# Patient Record
Sex: MALE | Race: WHITE | NOT HISPANIC OR LATINO
[De-identification: names, ages, dates, MRNs, and addresses within clinical notes are randomized per-mention and may not be internally consistent; named-entity substitution may affect disease eponyms.]

---

## 2017-01-26 ENCOUNTER — APPOINTMENT (OUTPATIENT)
Dept: CARDIOLOGY | Facility: CLINIC | Age: 57
End: 2017-01-26

## 2017-01-26 ENCOUNTER — NON-APPOINTMENT (OUTPATIENT)
Age: 57
End: 2017-01-26

## 2017-01-26 VITALS
HEIGHT: 73 IN | DIASTOLIC BLOOD PRESSURE: 90 MMHG | SYSTOLIC BLOOD PRESSURE: 179 MMHG | TEMPERATURE: 98.2 F | WEIGHT: 315 LBS | BODY MASS INDEX: 41.75 KG/M2 | HEART RATE: 80 BPM | OXYGEN SATURATION: 97 % | RESPIRATION RATE: 14 BRPM

## 2017-02-23 ENCOUNTER — NON-APPOINTMENT (OUTPATIENT)
Age: 57
End: 2017-02-23

## 2017-02-23 ENCOUNTER — APPOINTMENT (OUTPATIENT)
Dept: CARDIOLOGY | Facility: CLINIC | Age: 57
End: 2017-02-23

## 2017-02-23 VITALS
RESPIRATION RATE: 14 BRPM | OXYGEN SATURATION: 95 % | HEIGHT: 73 IN | WEIGHT: 315 LBS | HEART RATE: 86 BPM | BODY MASS INDEX: 41.75 KG/M2 | SYSTOLIC BLOOD PRESSURE: 164 MMHG | DIASTOLIC BLOOD PRESSURE: 93 MMHG

## 2017-03-16 ENCOUNTER — MEDICATION RENEWAL (OUTPATIENT)
Age: 57
End: 2017-03-16

## 2017-03-17 ENCOUNTER — APPOINTMENT (OUTPATIENT)
Dept: SURGERY | Facility: CLINIC | Age: 57
End: 2017-03-17

## 2017-03-17 VITALS
RESPIRATION RATE: 15 BRPM | WEIGHT: 315 LBS | HEIGHT: 72 IN | BODY MASS INDEX: 42.66 KG/M2 | SYSTOLIC BLOOD PRESSURE: 162 MMHG | OXYGEN SATURATION: 95 % | TEMPERATURE: 98.6 F | HEART RATE: 63 BPM | DIASTOLIC BLOOD PRESSURE: 85 MMHG

## 2017-03-23 ENCOUNTER — NON-APPOINTMENT (OUTPATIENT)
Age: 57
End: 2017-03-23

## 2017-03-23 ENCOUNTER — APPOINTMENT (OUTPATIENT)
Dept: CARDIOLOGY | Facility: CLINIC | Age: 57
End: 2017-03-23

## 2017-03-23 VITALS
WEIGHT: 315 LBS | HEIGHT: 72 IN | DIASTOLIC BLOOD PRESSURE: 90 MMHG | BODY MASS INDEX: 42.66 KG/M2 | HEART RATE: 60 BPM | SYSTOLIC BLOOD PRESSURE: 164 MMHG | RESPIRATION RATE: 16 BRPM

## 2017-04-06 ENCOUNTER — APPOINTMENT (OUTPATIENT)
Dept: VASCULAR SURGERY | Facility: CLINIC | Age: 57
End: 2017-04-06

## 2017-04-06 VITALS
BODY MASS INDEX: 42.66 KG/M2 | HEART RATE: 70 BPM | DIASTOLIC BLOOD PRESSURE: 90 MMHG | WEIGHT: 315 LBS | HEIGHT: 72 IN | TEMPERATURE: 98 F | SYSTOLIC BLOOD PRESSURE: 132 MMHG

## 2017-05-02 ENCOUNTER — TRANSCRIPTION ENCOUNTER (OUTPATIENT)
Age: 57
End: 2017-05-02

## 2017-05-12 ENCOUNTER — NON-APPOINTMENT (OUTPATIENT)
Age: 57
End: 2017-05-12

## 2017-05-12 ENCOUNTER — APPOINTMENT (OUTPATIENT)
Dept: CARDIOLOGY | Facility: CLINIC | Age: 57
End: 2017-05-12

## 2017-05-12 VITALS
RESPIRATION RATE: 14 BRPM | HEIGHT: 72 IN | DIASTOLIC BLOOD PRESSURE: 80 MMHG | BODY MASS INDEX: 42.66 KG/M2 | SYSTOLIC BLOOD PRESSURE: 152 MMHG | WEIGHT: 315 LBS | HEART RATE: 82 BPM

## 2017-05-19 ENCOUNTER — APPOINTMENT (OUTPATIENT)
Dept: SURGERY | Facility: CLINIC | Age: 57
End: 2017-05-19

## 2017-08-18 ENCOUNTER — APPOINTMENT (OUTPATIENT)
Dept: SURGERY | Facility: CLINIC | Age: 57
End: 2017-08-18
Payer: COMMERCIAL

## 2017-08-18 DIAGNOSIS — K22.11 ULCER OF ESOPHAGUS WITH BLEEDING: ICD-10-CM

## 2017-08-18 DIAGNOSIS — E55.9 VITAMIN D DEFICIENCY, UNSPECIFIED: ICD-10-CM

## 2017-08-18 PROCEDURE — 99213 OFFICE O/P EST LOW 20 MIN: CPT

## 2017-08-18 RX ORDER — SODIUM SULFATE, POTASSIUM SULFATE, MAGNESIUM SULFATE 17.5; 3.13; 1.6 G/ML; G/ML; G/ML
17.5-3.13-1.6 SOLUTION, CONCENTRATE ORAL
Qty: 354 | Refills: 0 | Status: DISCONTINUED | COMMUNITY
Start: 2017-06-10

## 2017-08-18 RX ORDER — TAMSULOSIN HYDROCHLORIDE 0.4 MG/1
0.4 CAPSULE ORAL
Qty: 30 | Refills: 0 | Status: DISCONTINUED | COMMUNITY
Start: 2017-04-26

## 2017-08-18 RX ORDER — ALBUTEROL SULFATE 90 UG/1
108 (90 BASE) AEROSOL, METERED RESPIRATORY (INHALATION)
Qty: 8 | Refills: 0 | Status: DISCONTINUED | COMMUNITY
Start: 2017-05-02

## 2017-08-18 RX ORDER — BENZONATATE 200 MG/1
200 CAPSULE ORAL
Qty: 20 | Refills: 0 | Status: DISCONTINUED | COMMUNITY
Start: 2017-04-29

## 2017-08-22 PROBLEM — E55.9 LOW VITAMIN D LEVEL: Status: ACTIVE | Noted: 2017-08-22

## 2017-08-29 ENCOUNTER — NON-APPOINTMENT (OUTPATIENT)
Age: 57
End: 2017-08-29

## 2017-08-29 ENCOUNTER — APPOINTMENT (OUTPATIENT)
Dept: CARDIOLOGY | Facility: CLINIC | Age: 57
End: 2017-08-29
Payer: COMMERCIAL

## 2017-08-29 VITALS
DIASTOLIC BLOOD PRESSURE: 84 MMHG | WEIGHT: 315 LBS | BODY MASS INDEX: 42.66 KG/M2 | RESPIRATION RATE: 14 BRPM | OXYGEN SATURATION: 96 % | HEART RATE: 66 BPM | HEIGHT: 72 IN | SYSTOLIC BLOOD PRESSURE: 165 MMHG

## 2017-08-29 PROCEDURE — 93000 ELECTROCARDIOGRAM COMPLETE: CPT

## 2017-08-29 PROCEDURE — 99214 OFFICE O/P EST MOD 30 MIN: CPT

## 2017-10-03 ENCOUNTER — APPOINTMENT (OUTPATIENT)
Dept: CARDIOLOGY | Facility: CLINIC | Age: 57
End: 2017-10-03
Payer: COMMERCIAL

## 2017-10-03 ENCOUNTER — NON-APPOINTMENT (OUTPATIENT)
Age: 57
End: 2017-10-03

## 2017-10-03 VITALS
BODY MASS INDEX: 42.66 KG/M2 | OXYGEN SATURATION: 96 % | RESPIRATION RATE: 14 BRPM | WEIGHT: 315 LBS | DIASTOLIC BLOOD PRESSURE: 80 MMHG | SYSTOLIC BLOOD PRESSURE: 181 MMHG | HEART RATE: 68 BPM | HEIGHT: 72 IN

## 2017-10-03 PROCEDURE — 93000 ELECTROCARDIOGRAM COMPLETE: CPT

## 2017-10-03 PROCEDURE — 99214 OFFICE O/P EST MOD 30 MIN: CPT

## 2017-10-13 ENCOUNTER — TRANSCRIPTION ENCOUNTER (OUTPATIENT)
Age: 57
End: 2017-10-13

## 2017-10-13 ENCOUNTER — OUTPATIENT (OUTPATIENT)
Dept: OUTPATIENT SERVICES | Facility: HOSPITAL | Age: 57
LOS: 1 days | End: 2017-10-13
Payer: COMMERCIAL

## 2017-10-13 VITALS
TEMPERATURE: 98 F | OXYGEN SATURATION: 98 % | HEIGHT: 73 IN | DIASTOLIC BLOOD PRESSURE: 78 MMHG | WEIGHT: 315 LBS | SYSTOLIC BLOOD PRESSURE: 135 MMHG | HEART RATE: 62 BPM | RESPIRATION RATE: 18 BRPM

## 2017-10-13 DIAGNOSIS — Z01.818 ENCOUNTER FOR OTHER PREPROCEDURAL EXAMINATION: ICD-10-CM

## 2017-10-13 DIAGNOSIS — Z98.890 OTHER SPECIFIED POSTPROCEDURAL STATES: Chronic | ICD-10-CM

## 2017-10-13 DIAGNOSIS — K21.9 GASTRO-ESOPHAGEAL REFLUX DISEASE WITHOUT ESOPHAGITIS: ICD-10-CM

## 2017-10-13 DIAGNOSIS — G47.33 OBSTRUCTIVE SLEEP APNEA (ADULT) (PEDIATRIC): ICD-10-CM

## 2017-10-13 DIAGNOSIS — I10 ESSENTIAL (PRIMARY) HYPERTENSION: ICD-10-CM

## 2017-10-13 DIAGNOSIS — E66.01 MORBID (SEVERE) OBESITY DUE TO EXCESS CALORIES: ICD-10-CM

## 2017-10-13 DIAGNOSIS — I48.0 PAROXYSMAL ATRIAL FIBRILLATION: ICD-10-CM

## 2017-10-13 LAB
ALBUMIN SERPL ELPH-MCNC: 4.3 G/DL — SIGNIFICANT CHANGE UP (ref 3.3–5)
ALP SERPL-CCNC: 65 U/L — SIGNIFICANT CHANGE UP (ref 40–120)
ALT FLD-CCNC: 24 U/L — SIGNIFICANT CHANGE UP (ref 10–45)
ANION GAP SERPL CALC-SCNC: 18 MMOL/L — HIGH (ref 5–17)
AST SERPL-CCNC: 26 U/L — SIGNIFICANT CHANGE UP (ref 10–40)
BILIRUB SERPL-MCNC: 0.6 MG/DL — SIGNIFICANT CHANGE UP (ref 0.2–1.2)
BLD GP AB SCN SERPL QL: NEGATIVE — SIGNIFICANT CHANGE UP
BUN SERPL-MCNC: 19 MG/DL — SIGNIFICANT CHANGE UP (ref 7–23)
CALCIUM SERPL-MCNC: 9.8 MG/DL — SIGNIFICANT CHANGE UP (ref 8.4–10.5)
CHLORIDE SERPL-SCNC: 102 MMOL/L — SIGNIFICANT CHANGE UP (ref 96–108)
CO2 SERPL-SCNC: 20 MMOL/L — LOW (ref 22–31)
CREAT SERPL-MCNC: 0.93 MG/DL — SIGNIFICANT CHANGE UP (ref 0.5–1.3)
GLUCOSE SERPL-MCNC: 85 MG/DL — SIGNIFICANT CHANGE UP (ref 70–99)
HBA1C BLD-MCNC: 5.8 % — HIGH (ref 4–5.6)
HCT VFR BLD CALC: 46.6 % — SIGNIFICANT CHANGE UP (ref 39–50)
HGB BLD-MCNC: 15.4 G/DL — SIGNIFICANT CHANGE UP (ref 13–17)
MCHC RBC-ENTMCNC: 29.6 PG — SIGNIFICANT CHANGE UP (ref 27–34)
MCHC RBC-ENTMCNC: 33 GM/DL — SIGNIFICANT CHANGE UP (ref 32–36)
MCV RBC AUTO: 89.4 FL — SIGNIFICANT CHANGE UP (ref 80–100)
PLATELET # BLD AUTO: 205 K/UL — SIGNIFICANT CHANGE UP (ref 150–400)
POTASSIUM SERPL-MCNC: 4.4 MMOL/L — SIGNIFICANT CHANGE UP (ref 3.5–5.3)
POTASSIUM SERPL-SCNC: 4.4 MMOL/L — SIGNIFICANT CHANGE UP (ref 3.5–5.3)
PROT SERPL-MCNC: 7.8 G/DL — SIGNIFICANT CHANGE UP (ref 6–8.3)
RBC # BLD: 5.21 M/UL — SIGNIFICANT CHANGE UP (ref 4.2–5.8)
RBC # FLD: 13.9 % — SIGNIFICANT CHANGE UP (ref 10.3–14.5)
RH IG SCN BLD-IMP: POSITIVE — SIGNIFICANT CHANGE UP
SODIUM SERPL-SCNC: 140 MMOL/L — SIGNIFICANT CHANGE UP (ref 135–145)
WBC # BLD: 7.47 K/UL — SIGNIFICANT CHANGE UP (ref 3.8–10.5)
WBC # FLD AUTO: 7.47 K/UL — SIGNIFICANT CHANGE UP (ref 3.8–10.5)

## 2017-10-13 PROCEDURE — 85027 COMPLETE CBC AUTOMATED: CPT

## 2017-10-13 PROCEDURE — 86850 RBC ANTIBODY SCREEN: CPT

## 2017-10-13 PROCEDURE — 80053 COMPREHEN METABOLIC PANEL: CPT

## 2017-10-13 PROCEDURE — 83036 HEMOGLOBIN GLYCOSYLATED A1C: CPT

## 2017-10-13 PROCEDURE — 86900 BLOOD TYPING SEROLOGIC ABO: CPT

## 2017-10-13 PROCEDURE — 86901 BLOOD TYPING SEROLOGIC RH(D): CPT

## 2017-10-13 PROCEDURE — G0463: CPT

## 2017-10-13 RX ORDER — LIDOCAINE HCL 20 MG/ML
0.2 VIAL (ML) INJECTION ONCE
Qty: 0 | Refills: 0 | Status: DISCONTINUED | OUTPATIENT
Start: 2017-10-24 | End: 2017-10-25

## 2017-10-13 RX ORDER — CEFAZOLIN SODIUM 1 G
3000 VIAL (EA) INJECTION ONCE
Qty: 0 | Refills: 0 | Status: DISCONTINUED | OUTPATIENT
Start: 2017-10-24 | End: 2017-10-25

## 2017-10-13 RX ORDER — SODIUM CHLORIDE 9 MG/ML
3 INJECTION INTRAMUSCULAR; INTRAVENOUS; SUBCUTANEOUS EVERY 8 HOURS
Qty: 0 | Refills: 0 | Status: DISCONTINUED | OUTPATIENT
Start: 2017-10-24 | End: 2017-10-25

## 2017-10-13 NOTE — H&P PST ADULT - PROBLEM SELECTOR PLAN 1
Laparoscopic Vertical sleeve Gastrectomy  Pre- Op instructions discussed    CBC,CMP,HgA1c,Type and Screen sent   heparin SQ ordered   incentive spirometer instructions discussed  Pt was consulted by pharmacist for medication regimen after surgery

## 2017-10-13 NOTE — H&P PST ADULT - HISTORY OF PRESENT ILLNESS
56 y/o male with PMH of Anxiety , HTN ,Palpitations , h/o Paroxysmal Afib, ROCIO- CPAP ,Morbid Obesity. Presents to PST for scheduled  Laparoscopic Vertical Sleeve Gastrectomy on 10/24/2017.

## 2017-10-13 NOTE — H&P PST ADULT - PSH
S/P colonoscopy    S/P inguinal hernia repair  7-8 years ago  S/P right knee arthroscopy  long time ago

## 2017-10-13 NOTE — H&P PST ADULT - PMH
Anxiety    GERD (gastroesophageal reflux disease)    HTN (hypertension)    Low vitamin D level    Morbid (severe) obesity due to excess calories    ROCIO on CPAP    Paroxysmal atrial fibrillation  on metoprolol -f/u with cardiology

## 2017-10-16 ENCOUNTER — RX RENEWAL (OUTPATIENT)
Age: 57
End: 2017-10-16

## 2017-10-16 ENCOUNTER — APPOINTMENT (OUTPATIENT)
Dept: SURGERY | Facility: CLINIC | Age: 57
End: 2017-10-16
Payer: COMMERCIAL

## 2017-10-16 VITALS
OXYGEN SATURATION: 99 % | HEIGHT: 72 IN | SYSTOLIC BLOOD PRESSURE: 135 MMHG | HEART RATE: 80 BPM | DIASTOLIC BLOOD PRESSURE: 85 MMHG | WEIGHT: 315 LBS | BODY MASS INDEX: 42.66 KG/M2 | RESPIRATION RATE: 18 BRPM | TEMPERATURE: 98.6 F

## 2017-10-16 PROCEDURE — 99215 OFFICE O/P EST HI 40 MIN: CPT

## 2017-10-24 ENCOUNTER — RESULT REVIEW (OUTPATIENT)
Age: 57
End: 2017-10-24

## 2017-10-24 ENCOUNTER — TRANSCRIPTION ENCOUNTER (OUTPATIENT)
Age: 57
End: 2017-10-24

## 2017-10-24 ENCOUNTER — INPATIENT (INPATIENT)
Facility: HOSPITAL | Age: 57
LOS: 0 days | Discharge: HOME | DRG: 621 | End: 2017-10-25
Attending: SURGERY | Admitting: SURGERY
Payer: COMMERCIAL

## 2017-10-24 ENCOUNTER — APPOINTMENT (OUTPATIENT)
Dept: SURGERY | Facility: HOSPITAL | Age: 57
End: 2017-10-24
Payer: COMMERCIAL

## 2017-10-24 VITALS
RESPIRATION RATE: 20 BRPM | HEART RATE: 70 BPM | TEMPERATURE: 98 F | DIASTOLIC BLOOD PRESSURE: 74 MMHG | HEIGHT: 72 IN | OXYGEN SATURATION: 95 % | WEIGHT: 315 LBS | SYSTOLIC BLOOD PRESSURE: 153 MMHG

## 2017-10-24 DIAGNOSIS — Z98.890 OTHER SPECIFIED POSTPROCEDURAL STATES: Chronic | ICD-10-CM

## 2017-10-24 DIAGNOSIS — E66.01 MORBID (SEVERE) OBESITY DUE TO EXCESS CALORIES: ICD-10-CM

## 2017-10-24 LAB
ANION GAP SERPL CALC-SCNC: 14 MMOL/L — SIGNIFICANT CHANGE UP (ref 5–17)
BASOPHILS # BLD AUTO: 0.1 K/UL — SIGNIFICANT CHANGE UP (ref 0–0.2)
BASOPHILS NFR BLD AUTO: 0.7 % — SIGNIFICANT CHANGE UP (ref 0–2)
BUN SERPL-MCNC: 15 MG/DL — SIGNIFICANT CHANGE UP (ref 7–23)
CALCIUM SERPL-MCNC: 9.1 MG/DL — SIGNIFICANT CHANGE UP (ref 8.4–10.5)
CHLORIDE SERPL-SCNC: 101 MMOL/L — SIGNIFICANT CHANGE UP (ref 96–108)
CO2 SERPL-SCNC: 24 MMOL/L — SIGNIFICANT CHANGE UP (ref 22–31)
CREAT SERPL-MCNC: 0.93 MG/DL — SIGNIFICANT CHANGE UP (ref 0.5–1.3)
EOSINOPHIL # BLD AUTO: 0 K/UL — SIGNIFICANT CHANGE UP (ref 0–0.5)
EOSINOPHIL NFR BLD AUTO: 0.2 % — SIGNIFICANT CHANGE UP (ref 0–6)
GLUCOSE BLDC GLUCOMTR-MCNC: 83 MG/DL — SIGNIFICANT CHANGE UP (ref 70–99)
GLUCOSE SERPL-MCNC: 110 MG/DL — HIGH (ref 70–99)
HCT VFR BLD CALC: 45.3 % — SIGNIFICANT CHANGE UP (ref 39–50)
HGB BLD-MCNC: 15.1 G/DL — SIGNIFICANT CHANGE UP (ref 13–17)
LYMPHOCYTES # BLD AUTO: 0.8 K/UL — LOW (ref 1–3.3)
LYMPHOCYTES # BLD AUTO: 6.6 % — LOW (ref 13–44)
MAGNESIUM SERPL-MCNC: 1.8 MG/DL — SIGNIFICANT CHANGE UP (ref 1.6–2.6)
MCHC RBC-ENTMCNC: 30.7 PG — SIGNIFICANT CHANGE UP (ref 27–34)
MCHC RBC-ENTMCNC: 33.4 GM/DL — SIGNIFICANT CHANGE UP (ref 32–36)
MCV RBC AUTO: 92.1 FL — SIGNIFICANT CHANGE UP (ref 80–100)
MONOCYTES # BLD AUTO: 0.3 K/UL — SIGNIFICANT CHANGE UP (ref 0–0.9)
MONOCYTES NFR BLD AUTO: 2.5 % — SIGNIFICANT CHANGE UP (ref 2–14)
NEUTROPHILS # BLD AUTO: 10.5 K/UL — HIGH (ref 1.8–7.4)
NEUTROPHILS NFR BLD AUTO: 90 % — HIGH (ref 43–77)
PHOSPHATE SERPL-MCNC: 2.8 MG/DL — SIGNIFICANT CHANGE UP (ref 2.5–4.5)
PLATELET # BLD AUTO: 184 K/UL — SIGNIFICANT CHANGE UP (ref 150–400)
POTASSIUM SERPL-MCNC: 4.3 MMOL/L — SIGNIFICANT CHANGE UP (ref 3.5–5.3)
POTASSIUM SERPL-SCNC: 4.3 MMOL/L — SIGNIFICANT CHANGE UP (ref 3.5–5.3)
RBC # BLD: 4.92 M/UL — SIGNIFICANT CHANGE UP (ref 4.2–5.8)
RBC # FLD: 13 % — SIGNIFICANT CHANGE UP (ref 10.3–14.5)
RH IG SCN BLD-IMP: POSITIVE — SIGNIFICANT CHANGE UP
SODIUM SERPL-SCNC: 139 MMOL/L — SIGNIFICANT CHANGE UP (ref 135–145)
TROPONIN T SERPL-MCNC: <0.01 NG/ML — SIGNIFICANT CHANGE UP (ref 0–0.06)
WBC # BLD: 11.7 K/UL — HIGH (ref 3.8–10.5)
WBC # FLD AUTO: 11.7 K/UL — HIGH (ref 3.8–10.5)

## 2017-10-24 PROCEDURE — 88305 TISSUE EXAM BY PATHOLOGIST: CPT | Mod: 26

## 2017-10-24 PROCEDURE — 93010 ELECTROCARDIOGRAM REPORT: CPT

## 2017-10-24 PROCEDURE — 43775 LAP SLEEVE GASTRECTOMY: CPT

## 2017-10-24 PROCEDURE — 88304 TISSUE EXAM BY PATHOLOGIST: CPT | Mod: 26

## 2017-10-24 PROCEDURE — 43281 LAP PARAESOPHAG HERN REPAIR: CPT | Mod: 59

## 2017-10-24 RX ORDER — ACETAMINOPHEN 500 MG
1000 TABLET ORAL ONCE
Qty: 0 | Refills: 0 | Status: COMPLETED | OUTPATIENT
Start: 2017-10-25 | End: 2017-10-25

## 2017-10-24 RX ORDER — MAGNESIUM SULFATE 500 MG/ML
2 VIAL (ML) INJECTION ONCE
Qty: 0 | Refills: 0 | Status: COMPLETED | OUTPATIENT
Start: 2017-10-24 | End: 2017-10-25

## 2017-10-24 RX ORDER — HEPARIN SODIUM 5000 [USP'U]/ML
5000 INJECTION INTRAVENOUS; SUBCUTANEOUS ONCE
Qty: 0 | Refills: 0 | Status: COMPLETED | OUTPATIENT
Start: 2017-10-24 | End: 2017-10-24

## 2017-10-24 RX ORDER — ACETAMINOPHEN 500 MG
1000 TABLET ORAL ONCE
Qty: 0 | Refills: 0 | Status: DISCONTINUED | OUTPATIENT
Start: 2017-10-25 | End: 2017-10-25

## 2017-10-24 RX ORDER — POTASSIUM CHLORIDE 20 MEQ
10 PACKET (EA) ORAL
Qty: 0 | Refills: 0 | Status: COMPLETED | OUTPATIENT
Start: 2017-10-24 | End: 2017-10-24

## 2017-10-24 RX ORDER — METOPROLOL TARTRATE 50 MG
5 TABLET ORAL EVERY 6 HOURS
Qty: 0 | Refills: 0 | Status: DISCONTINUED | OUTPATIENT
Start: 2017-10-24 | End: 2017-10-25

## 2017-10-24 RX ORDER — HYOSCYAMINE SULFATE 0.13 MG
1 TABLET ORAL
Qty: 28 | Refills: 0 | OUTPATIENT
Start: 2017-10-24 | End: 2017-10-31

## 2017-10-24 RX ORDER — OXYCODONE HYDROCHLORIDE 5 MG/1
5 TABLET ORAL
Qty: 120 | Refills: 0
Start: 2017-10-24 | End: 2017-10-28

## 2017-10-24 RX ORDER — ONDANSETRON 8 MG/1
4 TABLET, FILM COATED ORAL ONCE
Qty: 0 | Refills: 0 | Status: DISCONTINUED | OUTPATIENT
Start: 2017-10-24 | End: 2017-10-24

## 2017-10-24 RX ORDER — ACETAMINOPHEN 500 MG
1000 TABLET ORAL ONCE
Qty: 0 | Refills: 0 | Status: COMPLETED | OUTPATIENT
Start: 2017-10-24 | End: 2017-10-24

## 2017-10-24 RX ORDER — ONDANSETRON 8 MG/1
4 TABLET, FILM COATED ORAL EVERY 6 HOURS
Qty: 0 | Refills: 0 | Status: DISCONTINUED | OUTPATIENT
Start: 2017-10-24 | End: 2017-10-25

## 2017-10-24 RX ORDER — KETOROLAC TROMETHAMINE 30 MG/ML
30 SYRINGE (ML) INJECTION EVERY 6 HOURS
Qty: 0 | Refills: 0 | Status: DISCONTINUED | OUTPATIENT
Start: 2017-10-24 | End: 2017-10-25

## 2017-10-24 RX ORDER — HYDROMORPHONE HYDROCHLORIDE 2 MG/ML
0.5 INJECTION INTRAMUSCULAR; INTRAVENOUS; SUBCUTANEOUS
Qty: 0 | Refills: 0 | Status: DISCONTINUED | OUTPATIENT
Start: 2017-10-24 | End: 2017-10-24

## 2017-10-24 RX ORDER — PANTOPRAZOLE SODIUM 20 MG/1
40 TABLET, DELAYED RELEASE ORAL DAILY
Qty: 0 | Refills: 0 | Status: DISCONTINUED | OUTPATIENT
Start: 2017-10-24 | End: 2017-10-25

## 2017-10-24 RX ORDER — OMEPRAZOLE 10 MG/1
1 CAPSULE, DELAYED RELEASE ORAL
Qty: 30 | Refills: 0
Start: 2017-10-24 | End: 2017-11-23

## 2017-10-24 RX ORDER — CEFAZOLIN SODIUM 1 G
2000 VIAL (EA) INJECTION EVERY 8 HOURS
Qty: 0 | Refills: 0 | Status: COMPLETED | OUTPATIENT
Start: 2017-10-24 | End: 2017-10-25

## 2017-10-24 RX ORDER — SODIUM CHLORIDE 9 MG/ML
1000 INJECTION, SOLUTION INTRAVENOUS
Qty: 0 | Refills: 0 | Status: DISCONTINUED | OUTPATIENT
Start: 2017-10-24 | End: 2017-10-25

## 2017-10-24 RX ORDER — HEPARIN SODIUM 5000 [USP'U]/ML
5000 INJECTION INTRAVENOUS; SUBCUTANEOUS EVERY 8 HOURS
Qty: 0 | Refills: 0 | Status: DISCONTINUED | OUTPATIENT
Start: 2017-10-24 | End: 2017-10-25

## 2017-10-24 RX ADMIN — SODIUM CHLORIDE 3 MILLILITER(S): 9 INJECTION INTRAMUSCULAR; INTRAVENOUS; SUBCUTANEOUS at 21:49

## 2017-10-24 RX ADMIN — ONDANSETRON 4 MILLIGRAM(S): 8 TABLET, FILM COATED ORAL at 14:39

## 2017-10-24 RX ADMIN — Medication 30 MILLIGRAM(S): at 14:45

## 2017-10-24 RX ADMIN — Medication 30 MILLIGRAM(S): at 20:19

## 2017-10-24 RX ADMIN — Medication 1000 MILLIGRAM(S): at 20:00

## 2017-10-24 RX ADMIN — HYDROMORPHONE HYDROCHLORIDE 0.5 MILLIGRAM(S): 2 INJECTION INTRAMUSCULAR; INTRAVENOUS; SUBCUTANEOUS at 17:45

## 2017-10-24 RX ADMIN — SODIUM CHLORIDE 150 MILLILITER(S): 9 INJECTION, SOLUTION INTRAVENOUS at 14:27

## 2017-10-24 RX ADMIN — Medication 100 MILLIGRAM(S): at 20:36

## 2017-10-24 RX ADMIN — HEPARIN SODIUM 5000 UNIT(S): 5000 INJECTION INTRAVENOUS; SUBCUTANEOUS at 10:18

## 2017-10-24 RX ADMIN — SODIUM CHLORIDE 3 MILLILITER(S): 9 INJECTION INTRAMUSCULAR; INTRAVENOUS; SUBCUTANEOUS at 10:18

## 2017-10-24 RX ADMIN — HEPARIN SODIUM 5000 UNIT(S): 5000 INJECTION INTRAVENOUS; SUBCUTANEOUS at 14:39

## 2017-10-24 RX ADMIN — PANTOPRAZOLE SODIUM 40 MILLIGRAM(S): 20 TABLET, DELAYED RELEASE ORAL at 14:39

## 2017-10-24 RX ADMIN — Medication 30 MILLIGRAM(S): at 20:30

## 2017-10-24 RX ADMIN — HEPARIN SODIUM 5000 UNIT(S): 5000 INJECTION INTRAVENOUS; SUBCUTANEOUS at 22:25

## 2017-10-24 RX ADMIN — Medication 30 MILLIGRAM(S): at 14:30

## 2017-10-24 RX ADMIN — Medication 400 MILLIGRAM(S): at 19:50

## 2017-10-24 RX ADMIN — HYDROMORPHONE HYDROCHLORIDE 0.5 MILLIGRAM(S): 2 INJECTION INTRAMUSCULAR; INTRAVENOUS; SUBCUTANEOUS at 17:30

## 2017-10-24 RX ADMIN — ONDANSETRON 4 MILLIGRAM(S): 8 TABLET, FILM COATED ORAL at 20:15

## 2017-10-24 NOTE — CHART NOTE - NSCHARTNOTEFT_GEN_A_CORE
Surgery Post-Op Note    Procedure: Hiatal hernia repair, Gastrectomy, sleeve, laparoscopic    Subjective:   Pt seen and examined at the bedside. Pt w/ no complaints at the moment but was complaining of chest pain earlier EKG and troponins ordered. EKG NSR. Denies F/C/N/V. Pain controlled with medication.     T(C): 36.8 (10-24-17 @ 13:50), Max: 36.9 (10-24-17 @ 09:31)  HR: 61 (10-24-17 @ 17:45) (61 - 77)  BP: 135/63 (10-24-17 @ 17:30) (118/56 - 153/74)  RR: 19 (10-24-17 @ 17:45) (15 - 20)  SpO2: 97% (10-24-17 @ 17:45) (93% - 97%)  Wt(kg): --    10-24 @ 07:01  -  10-24 @ 17:53  --------------------------------------------------------  IN:    lactated ringers.: 600 mL  Total IN: 600 mL    OUT:    Indwelling Catheter - Urethral: 240 mL  Total OUT: 240 mL    Total NET: 360 mL      Physical Exam:  General: NAD, resting comfortably in bed  Neuro: A/O x 3, no focal deficits  Pulmonary: Nonlabored breathing, no respiratory distress  Cardiovascular: NSR  Abdominal: soft, ATTP. ND  Incision: C/D/I   Extremities: WWP    LABS:                        15.1   11.7  )-----------( 184      ( 24 Oct 2017 14:40 )             45.3     10-24    139  |  101  |  15  ----------------------------<  110<H>  4.3   |  24  |  0.93    Ca    9.1      24 Oct 2017 14:40  Phos  2.8     10-24  Mg     1.8     10-24        CAPILLARY BLOOD GLUCOSE  83 (24 Oct 2017 09:31)      POCT Blood Glucose.: 83 mg/dL (24 Oct 2017 09:39)      Assessment:57y Male s/p above procedure    Plan:  - IVF, Diet: NPO  - Pain/nausea control PRN  - Incentive spirometer/OOB/Ambulate  - DVT ppx

## 2017-10-24 NOTE — BRIEF OPERATIVE NOTE - POST-OP DX
Hiatal hernia  10/24/2017    Active  Angela Gallegos  Morbid obesity due to excess calories  10/24/2017    Active  Angela Gallegos

## 2017-10-24 NOTE — BRIEF OPERATIVE NOTE - OPERATION/FINDINGS
Moderate-sized type 3 paraesophageal hernia.  Stomach and GEJ reduced into the abdomen. Hernia sac and mediastinal fat pad transected. Crura reapproximated with sutures. Sleeve gastrectomy performed over 36-Fr calibration tube. Saline leak test negative. Staple line reinforced with Evicel and interrupted sutures.

## 2017-10-25 ENCOUNTER — TRANSCRIPTION ENCOUNTER (OUTPATIENT)
Age: 57
End: 2017-10-25

## 2017-10-25 VITALS — HEART RATE: 54 BPM | OXYGEN SATURATION: 95 %

## 2017-10-25 LAB
ANION GAP SERPL CALC-SCNC: 14 MMOL/L — SIGNIFICANT CHANGE UP (ref 5–17)
BASOPHILS # BLD AUTO: 0.01 K/UL — SIGNIFICANT CHANGE UP (ref 0–0.2)
BASOPHILS NFR BLD AUTO: 0.1 % — SIGNIFICANT CHANGE UP (ref 0–2)
BUN SERPL-MCNC: 15 MG/DL — SIGNIFICANT CHANGE UP (ref 7–23)
CALCIUM SERPL-MCNC: 8.4 MG/DL — SIGNIFICANT CHANGE UP (ref 8.4–10.5)
CHLORIDE SERPL-SCNC: 103 MMOL/L — SIGNIFICANT CHANGE UP (ref 96–108)
CO2 SERPL-SCNC: 24 MMOL/L — SIGNIFICANT CHANGE UP (ref 22–31)
CREAT SERPL-MCNC: 0.81 MG/DL — SIGNIFICANT CHANGE UP (ref 0.5–1.3)
EOSINOPHIL # BLD AUTO: 0.01 K/UL — SIGNIFICANT CHANGE UP (ref 0–0.5)
EOSINOPHIL NFR BLD AUTO: 0.1 % — SIGNIFICANT CHANGE UP (ref 0–6)
GLUCOSE SERPL-MCNC: 85 MG/DL — SIGNIFICANT CHANGE UP (ref 70–99)
HCT VFR BLD CALC: 41.7 % — SIGNIFICANT CHANGE UP (ref 39–50)
HGB BLD-MCNC: 13.7 G/DL — SIGNIFICANT CHANGE UP (ref 13–17)
IMM GRANULOCYTES NFR BLD AUTO: 0.3 % — SIGNIFICANT CHANGE UP (ref 0–1.5)
LYMPHOCYTES # BLD AUTO: 1.38 K/UL — SIGNIFICANT CHANGE UP (ref 1–3.3)
LYMPHOCYTES # BLD AUTO: 19.6 % — SIGNIFICANT CHANGE UP (ref 13–44)
MCHC RBC-ENTMCNC: 29.7 PG — SIGNIFICANT CHANGE UP (ref 27–34)
MCHC RBC-ENTMCNC: 32.9 GM/DL — SIGNIFICANT CHANGE UP (ref 32–36)
MCV RBC AUTO: 90.3 FL — SIGNIFICANT CHANGE UP (ref 80–100)
MONOCYTES # BLD AUTO: 0.7 K/UL — SIGNIFICANT CHANGE UP (ref 0–0.9)
MONOCYTES NFR BLD AUTO: 10 % — SIGNIFICANT CHANGE UP (ref 2–14)
NEUTROPHILS # BLD AUTO: 4.91 K/UL — SIGNIFICANT CHANGE UP (ref 1.8–7.4)
NEUTROPHILS NFR BLD AUTO: 69.9 % — SIGNIFICANT CHANGE UP (ref 43–77)
PLATELET # BLD AUTO: 186 K/UL — SIGNIFICANT CHANGE UP (ref 150–400)
POTASSIUM SERPL-MCNC: 4.4 MMOL/L — SIGNIFICANT CHANGE UP (ref 3.5–5.3)
POTASSIUM SERPL-SCNC: 4.4 MMOL/L — SIGNIFICANT CHANGE UP (ref 3.5–5.3)
RBC # BLD: 4.62 M/UL — SIGNIFICANT CHANGE UP (ref 4.2–5.8)
RBC # FLD: 14.2 % — SIGNIFICANT CHANGE UP (ref 10.3–14.5)
SODIUM SERPL-SCNC: 141 MMOL/L — SIGNIFICANT CHANGE UP (ref 135–145)
TROPONIN T SERPL-MCNC: <0.01 NG/ML — SIGNIFICANT CHANGE UP (ref 0–0.06)
WBC # BLD: 7.03 K/UL — SIGNIFICANT CHANGE UP (ref 3.8–10.5)
WBC # FLD AUTO: 7.03 K/UL — SIGNIFICANT CHANGE UP (ref 3.8–10.5)

## 2017-10-25 PROCEDURE — 86900 BLOOD TYPING SEROLOGIC ABO: CPT

## 2017-10-25 PROCEDURE — 83735 ASSAY OF MAGNESIUM: CPT

## 2017-10-25 PROCEDURE — 85027 COMPLETE CBC AUTOMATED: CPT

## 2017-10-25 PROCEDURE — 84100 ASSAY OF PHOSPHORUS: CPT

## 2017-10-25 PROCEDURE — 88305 TISSUE EXAM BY PATHOLOGIST: CPT

## 2017-10-25 PROCEDURE — 93005 ELECTROCARDIOGRAM TRACING: CPT

## 2017-10-25 PROCEDURE — 86901 BLOOD TYPING SEROLOGIC RH(D): CPT

## 2017-10-25 PROCEDURE — 82962 GLUCOSE BLOOD TEST: CPT

## 2017-10-25 PROCEDURE — C1889: CPT

## 2017-10-25 PROCEDURE — 88304 TISSUE EXAM BY PATHOLOGIST: CPT

## 2017-10-25 PROCEDURE — 80048 BASIC METABOLIC PNL TOTAL CA: CPT

## 2017-10-25 PROCEDURE — 84484 ASSAY OF TROPONIN QUANT: CPT

## 2017-10-25 RX ORDER — NIFEDIPINE 30 MG
1 TABLET, EXTENDED RELEASE 24 HR ORAL
Qty: 0 | Refills: 0 | COMMUNITY

## 2017-10-25 RX ORDER — ONDANSETRON 8 MG/1
1 TABLET, FILM COATED ORAL
Qty: 21 | Refills: 0
Start: 2017-10-25 | End: 2017-11-01

## 2017-10-25 RX ORDER — METOPROLOL TARTRATE 50 MG
1 TABLET ORAL
Qty: 0 | Refills: 0 | COMMUNITY

## 2017-10-25 RX ORDER — BUPROPION HYDROCHLORIDE 150 MG/1
1 TABLET, EXTENDED RELEASE ORAL
Qty: 0 | Refills: 0 | COMMUNITY

## 2017-10-25 RX ORDER — AMLODIPINE BESYLATE 2.5 MG/1
10 TABLET ORAL DAILY
Qty: 0 | Refills: 0 | Status: DISCONTINUED | OUTPATIENT
Start: 2017-10-25 | End: 2017-10-25

## 2017-10-25 RX ORDER — SODIUM CHLORIDE 9 MG/ML
1000 INJECTION INTRAMUSCULAR; INTRAVENOUS; SUBCUTANEOUS ONCE
Qty: 0 | Refills: 0 | Status: COMPLETED | OUTPATIENT
Start: 2017-10-25 | End: 2017-10-25

## 2017-10-25 RX ORDER — METOPROLOL TARTRATE 50 MG
50 TABLET ORAL
Qty: 0 | Refills: 0 | Status: DISCONTINUED | OUTPATIENT
Start: 2017-10-25 | End: 2017-10-25

## 2017-10-25 RX ORDER — PANTOPRAZOLE SODIUM 20 MG/1
1 TABLET, DELAYED RELEASE ORAL
Qty: 0 | Refills: 0 | COMMUNITY

## 2017-10-25 RX ADMIN — Medication 30 MILLIGRAM(S): at 02:03

## 2017-10-25 RX ADMIN — Medication 400 MILLIGRAM(S): at 01:12

## 2017-10-25 RX ADMIN — Medication 50 GRAM(S): at 03:55

## 2017-10-25 RX ADMIN — Medication 400 MILLIGRAM(S): at 06:34

## 2017-10-25 RX ADMIN — ONDANSETRON 4 MILLIGRAM(S): 8 TABLET, FILM COATED ORAL at 02:03

## 2017-10-25 RX ADMIN — SODIUM CHLORIDE 1000 MILLILITER(S): 9 INJECTION INTRAMUSCULAR; INTRAVENOUS; SUBCUTANEOUS at 02:42

## 2017-10-25 RX ADMIN — HEPARIN SODIUM 5000 UNIT(S): 5000 INJECTION INTRAVENOUS; SUBCUTANEOUS at 06:34

## 2017-10-25 RX ADMIN — Medication 100 MILLIGRAM(S): at 05:09

## 2017-10-25 RX ADMIN — ONDANSETRON 4 MILLIGRAM(S): 8 TABLET, FILM COATED ORAL at 07:28

## 2017-10-25 RX ADMIN — AMLODIPINE BESYLATE 10 MILLIGRAM(S): 2.5 TABLET ORAL at 08:09

## 2017-10-25 RX ADMIN — SODIUM CHLORIDE 3 MILLILITER(S): 9 INJECTION INTRAMUSCULAR; INTRAVENOUS; SUBCUTANEOUS at 05:41

## 2017-10-25 RX ADMIN — Medication 30 MILLIGRAM(S): at 07:28

## 2017-10-25 NOTE — PROGRESS NOTE ADULT - SUBJECTIVE AND OBJECTIVE BOX
Post Op Day#: 1    Subjective: "Feels good, no pain, mild soreness".     Objective: No cardio/pulmonary a/e overnight. V/S Stable afebrile.  Ambulating independently. Tolerating bariatric liquid. Denies nausea and vomiting.  Effective pain control.  Voiding                                               Vital Signs Last 24 Hrs  T(C): 36.4 (25 Oct 2017 08:41), Max: 36.9 (24 Oct 2017 15:00)  T(F): 97.5 (25 Oct 2017 08:41), Max: 98.4 (24 Oct 2017 15:00)  HR: 54 (25 Oct 2017 11:06) (54 - 90)  BP: 138/71 (25 Oct 2017 08:41) (116/58 - 147/75)  BP(mean): 81 (24 Oct 2017 20:00) (81 - 97)  RR: 18 (25 Oct 2017 08:41) (15 - 20)  SpO2: 95% (25 Oct 2017 11:06) (93% - 100%)                                               I&O's Summary    24 Oct 2017 07:01  -  25 Oct 2017 07:00  --------------------------------------------------------  IN: 1900 mL / OUT: 880 mL / NET: 1020 mL    25 Oct 2017 07:01  -  25 Oct 2017 11:49  --------------------------------------------------------  IN: 60 mL / OUT: 200 mL / NET: -140 mL  ORAL 360ml+ bariatric liquid                                                                        13.7   7.03  )-----------( 186      ( 25 Oct 2017 08:35 )             41.7                                                 10-25    141  |  103  |  15  ----------------------------<  85  4.4   |  24  |  0.81    Ca    8.4      25 Oct 2017 07:05  Phos  2.8     10-24  Mg     1.8     10-24        Physical Exam:         Lungs:  clear breath sounds b/l       Heart:  Regular rate & rhythm       Abdomen:  Soft, non-distended.  Scopes sites clean, dry and intact. + bs, - flatus, no rebound or guarding       Skin:  No rash       Extremities: + pulses, no edema, no calf tenderness, negative julieta's                Caprini VTE Risk Score: 3-4 (moderate   ) No extended prophylaxis required post-discharge  [facility] VTE RISK SCORE: 11 (Low)No extended prophylaxis required post-discharge    Assessment and Plan: S/P lap Gastric Sleeve    - Continue Bariatric Clear diet today and advanced to protocol-derived meal progression supervised by RDb  - GI prophylaxis, Incentive spirometry  - Ambulation at least every 3 hours  -  Procedure specific education including diet, vitamins and VTE prevention. Written materials given  - Medication reviewed and reconciled, Bariatric meds obtained from [facility]  -  Met Bariatric 8-Point d/c criteria   -  Follow up with Dr Stubbs in 7-10 days, Dietitian and PMD in 30 days.      Marleny Prasad, FRANCOISE, ANP  [phone]
Bariatric Surgery Progress Note    Post Op Day#: 1    24 hr events/Subjective:     No acute issues overnight  Pain controlled with medications  Nausea controlled with anti-ematics   Voiding      Procedure:  Hiatal hernia repair  Gastrectomy, sleeve, laparoscopic      Vital Signs Last 24 Hrs  T(C): 36.4 (25 Oct 2017 04:51), Max: 36.9 (24 Oct 2017 09:31)  T(F): 97.6 (25 Oct 2017 04:51), Max: 98.4 (24 Oct 2017 09:31)  HR: 57 (25 Oct 2017 05:40) (55 - 90)  BP: 131/70 (25 Oct 2017 04:51) (116/58 - 153/74)  BP(mean): 81 (24 Oct 2017 20:00) (81 - 97)  RR: 18 (25 Oct 2017 04:51) (15 - 20)  SpO2: 99% (25 Oct 2017 05:40) (93% - 100%)  Height (cm): 182.88 (10-24 @ 10:07)  Weight (kg): 157 (10-24 @ 09:31)  BMI (kg/m2): 46.9 (10-24 @ 10:07)  BSA (m2): 2.69 (10-24 @ 10:07)  I&O's Summary    24 Oct 2017 07:01  -  25 Oct 2017 06:50  --------------------------------------------------------  IN: 1900 mL / OUT: 730 mL / NET: 1170 mL      I&O's Detail    24 Oct 2017 07:01  -  25 Oct 2017 06:50  --------------------------------------------------------  IN:    IV PiggyBack: 150 mL    lactated ringers.: 750 mL    Sodium Chloride 0.9% IV Bolus: 1000 mL  Total IN: 1900 mL    OUT:    Indwelling Catheter - Urethral: 430 mL    Voided: 300 mL  Total OUT: 730 mL    Total NET: 1170 mL      Physical Exam:    General:  Appears stated age, well-groomed, well-nourished, no distress  Chest:  clear breath sounds  Cardiovascular:  Regular rate & rhythm  Abdomen:  Soft, incisions clean, dry intact, tenderness around incisions, no rebound or guarding  Skin:  No rash  Neuro/Psych:  Alert, oriented to time, place and person                           15.1   11.7  )-----------( 184      ( 24 Oct 2017 14:40 )             45.3       10-24    139  |  101  |  15  ----------------------------<  110<H>  4.3   |  24  |  0.93    Ca    9.1      24 Oct 2017 14:40  Phos  2.8     10-24  Mg     1.8     10-24        acetaminophen  IVPB. milliGRAM(s) IV Intermittent once  aluminum hydroxide/magnesium hydroxide/simethicone Suspension milliLiter(s) Oral every 4 hours PRN  ceFAZolin   IVPB milliGRAM(s) IV Intermittent once  heparin  Injectable Unit(s) SubCutaneous every 8 hours  ketorolac   Injectable milliGRAM(s) IV Push every 6 hours  lactated ringers. milliLiter(s) IV Continuous <Continuous>  lidocaine 1% Injectable milliLiter(s) Local Injection once  metoprolol IVPB milliGRAM(s) IV Intermittent every 6 hours  ondansetron Injectable milliGRAM(s) IV Push every 6 hours  pantoprazole  Injectable milliGRAM(s) IV Push daily  sodium chloride 0.9% lock flush milliLiter(s) IV Push every 8 hours          Assessment and Plan: 57y y/o Male s/p Hiatal hernia repair; Lap Gastrectomy sleeve, POD #1   - Start PO liquid oxycodone PRN for pain  - Continue ambulation   - Encourage Incentive Spirometer use  - Bariartic clears   - Continue chemical and mechanical DVT prophylaxis  - Discharge home once tolerating adequate PO and 8 point discharge criteria met    Discuss with attending

## 2017-10-25 NOTE — PHARMACY COMMUNICATION NOTE - COMMENTS
Patient medication reconciliation done. Patient currently taking:     Pantoprazole 40mg DR tab by mouth daily  Metoprolol succinate ER tab by mouth daily  Buproprion 150mg XL tab by mouth daily  Nifedipine 90mg XR cap by mouth daily    Patient was re-educated to take daily multi-vitamins post surgically. Patient re-educated on NSAID avoidance (Ibuprofen, ASA, naproxen, aleve) as they increased risk of GI bleeding. Patient educated to use APAP for mild pain otherwise contact prescriber for consult. Patient was instructed to take the medications as follows:    Switch pantoprazole to omeprazole 40mg DR cap - sprinkle contents of capsule and take with unsweetened applesauce  Switch metoprolol succinate to metoprolol tartrate 50mg by mouth twice daily (crush)  Switch bupropion XL to bupropion IR 75mg by mouth twice daily (crush)  Switch nifedipine XR to amlodipine 10mg tab by mouth daily (crush)    Patient was informed to contact PCP if uncontrolled on bupropion or blood pressure medications.

## 2017-10-25 NOTE — DIETITIAN INITIAL EVALUATION ADULT. - NS FNS WEIGHT USED FOR CALC
adjusted/IBW for protein, 6 cups of fluid (48 ounces) to start working up to at least 8 cups (64 ounces) per day

## 2017-10-25 NOTE — DISCHARGE NOTE ADULT - HOSPITAL COURSE
On 10/24./2017 patient underwent Laparoscopic Gastric Sleeve Gastrectomy for BMI 46.9 Prior to start of procedure, he received VTE and SSI prophylaxis. The procedure went as planned, and patient was subsequently extubated in the OR and taken to the recovery room. Patient's pain was controlled with multi-modal non-opiod analgesia. Once hemodynamically stable, he ambulated with assistance. Goodman removed and spontaneous return of bladder function. The patient was later transferred to the bariatric unit and placed on bedside continuous pulse oximetry.  POst op cardiac enzymes given patient cardiac history checked-negative   Pt received 1 L IVF bolus for low urine output once goodman removed      On POD#1 patient remained stable and was advanced to bariatric clear liquid which he tolerated. The patient is  ambulating independently and has effective pain control. pt seen by pharmacy and medications changed as home regimen included extended release medications. The rest of his hospital course was uneventful and he was subsequently cleared for discharge. Procedure specific written discharge instructions explained and provided including signs and symptoms of postoperative complications. Patient was  advised to begin sugar-free full liquid diet starting tomorrow for a period of 4 weeks. He will follow up with his dietitian for additional instructions regarding subsequent meal plans and primary care physician in 30 days. Routine bariatric medications obtained from pharmacy prior to discharge. He will follow up with Dr Cordero in one to two weeks and every month thereafter for the first year following surgery.

## 2017-10-25 NOTE — PROGRESS NOTE ADULT - ATTENDING COMMENTS
I saw and examined the patient, and reviewed  the history and data with the patient and staff.  Agree with note which was also reviewed and edited where appropriate.  D/W patient, RN, residents and Fellow

## 2017-10-25 NOTE — DISCHARGE NOTE ADULT - INSTRUCTIONS
Full liquid diet and very thin puree. 6 meals per day for 4 weeks with 3 protein supplements per day. Chicken/vegetable soup, lentil/pea soup, whey protein, puddings, yogurt, jello, sugar free popsicles.   Drink and eat slowly to avoid nausea and vomiting. Start with 30 ml every 15 minutes. increase as tolerated. Stop drinking when you feel full. Allow 10-15 min. for your stomach to empty. Your goal is >60 grams of protein daily (80 grams is optimal). Contact your nutritionist for further clarification.   No water during meals. Drink water 1 hour prior to or after meals. Drink at least 1.5 Liters of non-carbonated liquids at room temperature throughout the day to avoid dehydration. Some signs of dehydration include dry mouth and dark urine. Avoid gulping as this can cause pain and discomfort.   Complete your prescriptions of Levsin, protonix, and zofran as needed.   Please do not drive until your pain is well controlled, and you do not need to take pain medications. These medications may make you constipated. If so, please take over the counter stool softeners for symptoms.   Avoid swallowing pills unless medications cannot be crushed or cut.   Please follow up with Dr. garg  in 1-2 weeks. Contact info below. Full liquid diet and very thin puree. 6 meals per day for 2 weeks with 3 protein supplements per day. Chicken/vegetable soup, lentil/pea soup, whey protein, puddings, yogurt, jello, sugar free popsicles.   Drink and eat slowly to avoid nausea and vomiting. Start with 30 ml every 15 minutes. increase as tolerated. Stop drinking when you feel full. Allow 10-15 min. for your stomach to empty. Your goal is >60 grams of protein daily (80 grams is optimal). Contact your nutritionist for further clarification.   No water during meals. Drink water 1 hour prior to or after meals. Drink at least 1.5 Liters of non-carbonated liquids at room temperature throughout the day to avoid dehydration. Some signs of dehydration include dry mouth and dark urine. Avoid gulping as this can cause pain and discomfort.   Complete your prescriptions of Levsin, protonix, and zofran as needed.   Please do not drive until your pain is well controlled, and you do not need to take pain medications. These medications may make you constipated. If so, please take over the counter stool softeners for symptoms.   Avoid swallowing pills unless medications cannot be crushed or cut.   Please follow up with Dr. garg  in 1-2 weeks. Contact info below.

## 2017-10-25 NOTE — DISCHARGE NOTE ADULT - CARE PLAN
Principal Discharge DX:	Morbid (severe) obesity due to excess calories  Goal:	post op care  Instructions for follow-up, activity and diet:	Check Temperature daily for 1st week. If 101 degrees or higher please call your surgeon's office.   Please use your incentive spirometer 4 times daily for 10 days. Call the office if feeling short of breath, restless, having chest pain, nausea, vomiting, abdominal pain or any other concerns.   You may shower, do not scrub your incisions (Notify us of redness/drainage from incisions). If any significant drainage, note color, odor, and amount. wash with antibacterial soap and gently pat dry.   If drain in place, empty and record output as instructed.   Walk daily until you can walk 30 minutes at a time. Avoid heavy lifting or straining for 8 weeks. When you do lift, keep your back straight and bend at the knees allowing your legs to do most of the work. Do not start an exercise program until you talk with your doctor.   Avoid swallowing pills unless medications cannot be crushed or cut.   Do not take any anti-inflammatory medications like Advil, ibuprofen, aleve, Motrin. You may take Tylenol (Acetaminophen).   if you encounter a problem, contact your surgeon or return to [facility]. It is helpful to have the same follow up with YOUR surgeon who performed YOUR procedure. Do not allow a stomach tube (NGT) to be inserted for any reason without X-ray guidance!    follow up with Dr. Cordero in 1 week Principal Discharge DX:	Morbid (severe) obesity due to excess calories  Goal:	Pt will remain w/o s/s of acute postoperative complications. Make healthy lifestyle changes for improve quality of life. post op care  Instructions for follow-up, activity and diet:	Check Temperature daily for 1st week. If 101 degrees or higher please call your surgeon's office.   Please use your incentive spirometer 4 times daily for 10 days. Call the office if feeling short of breath, restless, having chest pain, nausea, vomiting, abdominal pain or any other concerns.   You may shower, do not scrub your incisions (Notify us of redness/drainage from incisions). If any significant drainage, note color, odor, and amount. wash with antibacterial soap and gently pat dry.   If drain in place, empty and record output as instructed.   Walk daily until you can walk 30 minutes at a time. Avoid heavy lifting or straining for 8 weeks. When you do lift, keep your back straight and bend at the knees allowing your legs to do most of the work. Do not start an exercise program until you talk with your doctor.   Avoid swallowing pills unless medications cannot be crushed or cut.   Do not take any anti-inflammatory medications like Advil, ibuprofen, aleve, Motrin. You may take Tylenol (Acetaminophen).   if you encounter a problem, contact your surgeon or return to [facility]. It is helpful to have the same follow up with YOUR surgeon who performed YOUR procedure. Do not allow a stomach tube (NGT) to be inserted for any reason without X-ray guidance!    follow up with Dr. Cordero in 1 week

## 2017-10-25 NOTE — DISCHARGE NOTE ADULT - ADDITIONAL INSTRUCTIONS
additional instructions as explained and outlined on the gastric sleeve instructions. Follow up with your PMD and cardiologist  in 30 days

## 2017-10-25 NOTE — DISCHARGE NOTE ADULT - CARE PROVIDER_API CALL
Mane Cordero), Surgery  [address] [facility]  [address] [place] [id number]  Phone: [phone]  Fax: [phone]

## 2017-10-25 NOTE — DIETITIAN INITIAL EVALUATION ADULT. - OTHER INFO
Consult received for bariatric surgery. Per chart pt has tried multiple wt loss attempts. Per outpatient RD note on 4/2/17, pt has tried following a diet regimen by controlling the quality and quantity of meals mostly by [employer] but was unable to lose a significant amount of wt and keep it off. Pt reports weighing 364 pounds before going on the 2 week full liquid diet and his presurgical wt was 346.1 pounds. Pt now S/P laparoscopic vertical sleeve gastrectomy. Pt denies nausea/vomiting, awaiting clear liquid bariatric diet at time of visit. Pt with knowledge of bariatric full liquid diet however receptive to in depth review/reinforcement. Pt states having some protein shakes at home and plans to purchase more. Pt states he also purchased some of the necessary vitamins. Pt was advised to purchase chewable/liquid multivitamin with added elemental iron, chewable/liquid calcium citrate with vitamin D and sublingual B12. Pt was advised to take the chewable/liquid multivitamin with added elemental iron at least 2 hours apart from the chewable/liquid calcium citrate with vitamin D for optimal absorption. Pt states he plans to schedule a follow up appointment with outpatient RD. Pt able to teach back all points discussed during interview.

## 2017-10-25 NOTE — DISCHARGE NOTE ADULT - PATIENT PORTAL LINK FT
“You can access the [employer] Patient Portal, offered by [facility], by registering with the following website: [email or web address]”

## 2017-10-25 NOTE — DISCHARGE NOTE ADULT - MEDICATION SUMMARY - MEDICATIONS TO STOP TAKING
I will STOP taking the medications listed below when I get home from the hospital:    buPROPion 150 mg/24 hours (XL) oral tablet, extended release  -- 1 tab(s) by mouth every 24 hours - hold for 1 month after surgery    Metoprolol Succinate  mg oral tablet, extended release  -- 1 tab(s) by mouth once a day - hold for 1 month after surgery    NIFEdipine 90 mg oral tablet, extended release  -- 1 tab(s) by mouth once a day - hold for 1 month after surgery

## 2017-10-25 NOTE — DISCHARGE NOTE ADULT - MEDICATION SUMMARY - MEDICATIONS TO TAKE
I will START or STAY ON the medications listed below when I get home from the hospital:    oxyCODONE 5 mg/5 mL oral solution  -- 5 milliliter(s) by mouth every 4 hours MDD:30 as needed  -- Caution federal law prohibits the transfer of this drug to any person other  than the person for whom it was prescribed.  May cause drowsiness.  Alcohol may intensify this effect.  Use care when operating dangerous machinery.  This prescription cannot be refilled.  Using more of this medication than prescribed may cause serious breathing problems.    -- Indication: For Post op pain    metoprolol tartrate 50 mg oral tablet  -- 1 tab(s) by mouth 2 times a day - start after surgery  -- Indication: For High blood pressure    amLODIPine 10 mg oral tablet  -- 1 tab(s) by mouth once a day - start after surgery  -- Indication: For High blood pressure    hyoscyamine 0.125 mg sublingual tablet  -- 1 tab(s) under tongue every 6 hours MDD:4 as needed  -- May cause drowsiness.  Alcohol may intensify this effect.  Use care when operating dangerous machinery.    -- Indication: For Gastric spasms    omeprazole 40 mg oral delayed release capsule  -- 1 cap(s) by mouth once a day MDD:1 open and mix in applesauce  -- do not swallow whole,mix content in applesauce  -- Indication: For reflux    buPROPion 75 mg oral tablet  -- 75 milligram(s) by mouth 2 times a day - start after surgery  -- Indication: For Mood I will START or STAY ON the medications listed below when I get home from the hospital:    oxyCODONE 5 mg/5 mL oral solution  -- 5 milliliter(s) by mouth every 4 hours MDD:30 as needed  -- Caution federal law prohibits the transfer of this drug to any person other  than the person for whom it was prescribed.  May cause drowsiness.  Alcohol may intensify this effect.  Use care when operating dangerous machinery.  This prescription cannot be refilled.  Using more of this medication than prescribed may cause serious breathing problems.    -- Indication: For Post op pain    metoprolol tartrate 50 mg oral tablet  -- 1 tab(s) by mouth 2 times a day - start after surgery  -- Indication: For High blood pressure    amLODIPine 10 mg oral tablet  -- 1 tab(s) by mouth once a day - start after surgery  -- Indication: For High blood pressure    omeprazole 40 mg oral delayed release capsule  -- 1 cap(s) by mouth once a day MDD:1 open and mix in applesauce  -- do not swallow whole,mix content in applesauce  -- Indication: For reflux    buPROPion 75 mg oral tablet  -- 75 milligram(s) by mouth 2 times a day - start after surgery  -- Indication: For Mood I will START or STAY ON the medications listed below when I get home from the hospital:    oxyCODONE 5 mg/5 mL oral solution  -- 5 milliliter(s) by mouth every 4 hours MDD:30 as needed  -- Caution federal law prohibits the transfer of this drug to any person other  than the person for whom it was prescribed.  May cause drowsiness.  Alcohol may intensify this effect.  Use care when operating dangerous machinery.  This prescription cannot be refilled.  Using more of this medication than prescribed may cause serious breathing problems.    -- Indication: For Post op pain    Zofran ODT 4 mg oral tablet, disintegrating  -- 1 tab(s) by mouth 3 times a day MDD:3 as needed  -- Indication: For nausea    metoprolol tartrate 50 mg oral tablet  -- 1 tab(s) by mouth 2 times a day - start after surgery  -- Indication: For High blood pressure    amLODIPine 10 mg oral tablet  -- 1 tab(s) by mouth once a day - start after surgery  -- Indication: For High blood pressure    omeprazole 40 mg oral delayed release capsule  -- 1 cap(s) by mouth once a day MDD:1 open and mix in applesauce  -- do not swallow whole,mix content in applesauce  -- Indication: For reflux    buPROPion 75 mg oral tablet  -- 75 milligram(s) by mouth 2 times a day - start after surgery  -- Indication: For Mood

## 2017-10-25 NOTE — DIETITIAN INITIAL EVALUATION ADULT. - ADHERENCE
Pt reports following a full liquid diet for 2 weeks PTA as instructed by outpatient RD. Pt reports having Premier protein shakes, broth, diet snapple and crystal light.

## 2017-10-25 NOTE — DISCHARGE NOTE ADULT - PLAN OF CARE
post op care Check Temperature daily for 1st week. If 101 degrees or higher please call your surgeon's office.   Please use your incentive spirometer 4 times daily for 10 days. Call the office if feeling short of breath, restless, having chest pain, nausea, vomiting, abdominal pain or any other concerns.   You may shower, do not scrub your incisions (Notify us of redness/drainage from incisions). If any significant drainage, note color, odor, and amount. wash with antibacterial soap and gently pat dry.   If drain in place, empty and record output as instructed.   Walk daily until you can walk 30 minutes at a time. Avoid heavy lifting or straining for 8 weeks. When you do lift, keep your back straight and bend at the knees allowing your legs to do most of the work. Do not start an exercise program until you talk with your doctor.   Avoid swallowing pills unless medications cannot be crushed or cut.   Do not take any anti-inflammatory medications like Advil, ibuprofen, aleve, Motrin. You may take Tylenol (Acetaminophen).   if you encounter a problem, contact your surgeon or return to [facility]. It is helpful to have the same follow up with YOUR surgeon who performed YOUR procedure. Do not allow a stomach tube (NGT) to be inserted for any reason without X-ray guidance!    follow up with Dr. Cordero in 1 week Pt will remain w/o s/s of acute postoperative complications. Make healthy lifestyle changes for improve quality of life. post op care

## 2017-10-25 NOTE — DIETITIAN INITIAL EVALUATION ADULT. - ENERGY NEEDS
Ht: 72“, Wt: 346.1 lbs- presurgical, BMI: 46.9 kg/m2, IBW: 178 pounds  (+/-10%), %IBW:  no edema or pressure injuries

## 2017-10-27 LAB — SURGICAL PATHOLOGY STUDY: SIGNIFICANT CHANGE UP

## 2017-11-03 ENCOUNTER — APPOINTMENT (OUTPATIENT)
Dept: SURGERY | Facility: CLINIC | Age: 57
End: 2017-11-03
Payer: COMMERCIAL

## 2017-11-03 VITALS
SYSTOLIC BLOOD PRESSURE: 150 MMHG | HEART RATE: 70 BPM | TEMPERATURE: 98.3 F | BODY MASS INDEX: 41.75 KG/M2 | DIASTOLIC BLOOD PRESSURE: 87 MMHG | WEIGHT: 315 LBS | HEIGHT: 73 IN | OXYGEN SATURATION: 98 %

## 2017-11-03 PROCEDURE — 99024 POSTOP FOLLOW-UP VISIT: CPT

## 2017-12-01 ENCOUNTER — APPOINTMENT (OUTPATIENT)
Dept: SURGERY | Facility: CLINIC | Age: 57
End: 2017-12-01
Payer: COMMERCIAL

## 2017-12-01 VITALS
BODY MASS INDEX: 41.75 KG/M2 | HEIGHT: 73 IN | SYSTOLIC BLOOD PRESSURE: 167 MMHG | DIASTOLIC BLOOD PRESSURE: 91 MMHG | WEIGHT: 315 LBS | TEMPERATURE: 97.9 F | HEART RATE: 72 BPM | OXYGEN SATURATION: 96 % | RESPIRATION RATE: 17 BRPM

## 2017-12-01 PROCEDURE — 99024 POSTOP FOLLOW-UP VISIT: CPT

## 2017-12-01 RX ORDER — METOPROLOL TARTRATE 50 MG/1
50 TABLET, FILM COATED ORAL
Qty: 180 | Refills: 0 | Status: DISCONTINUED | COMMUNITY
Start: 2017-10-16

## 2017-12-01 RX ORDER — ONDANSETRON 4 MG/1
4 TABLET, ORALLY DISINTEGRATING ORAL
Qty: 21 | Refills: 0 | Status: DISCONTINUED | COMMUNITY
Start: 2017-10-25

## 2017-12-01 RX ORDER — OMEPRAZOLE 40 MG/1
40 CAPSULE, DELAYED RELEASE ORAL
Qty: 30 | Refills: 0 | Status: DISCONTINUED | COMMUNITY
Start: 2017-10-24

## 2017-12-01 RX ORDER — HYOSCYAMINE SULFATE 0.12 MG/1
0.12 TABLET SUBLINGUAL
Qty: 28 | Refills: 0 | Status: DISCONTINUED | COMMUNITY
Start: 2017-10-24

## 2017-12-01 RX ORDER — OXYCODONE HYDROCHLORIDE 5 MG/5ML
5 SOLUTION ORAL
Qty: 120 | Refills: 0 | Status: DISCONTINUED | COMMUNITY
Start: 2017-10-24

## 2017-12-01 RX ORDER — BUPROPION HYDROCHLORIDE 75 MG/1
75 TABLET, FILM COATED ORAL
Qty: 90 | Refills: 0 | Status: DISCONTINUED | COMMUNITY
Start: 2017-10-16

## 2018-01-09 ENCOUNTER — APPOINTMENT (OUTPATIENT)
Dept: CARDIOLOGY | Facility: CLINIC | Age: 58
End: 2018-01-09
Payer: COMMERCIAL

## 2018-01-09 ENCOUNTER — NON-APPOINTMENT (OUTPATIENT)
Age: 58
End: 2018-01-09

## 2018-01-09 VITALS
DIASTOLIC BLOOD PRESSURE: 76 MMHG | HEIGHT: 73 IN | HEART RATE: 72 BPM | RESPIRATION RATE: 14 BRPM | WEIGHT: 308 LBS | SYSTOLIC BLOOD PRESSURE: 144 MMHG | BODY MASS INDEX: 40.82 KG/M2

## 2018-01-09 PROCEDURE — 99214 OFFICE O/P EST MOD 30 MIN: CPT

## 2018-01-09 PROCEDURE — 93000 ELECTROCARDIOGRAM COMPLETE: CPT

## 2018-01-29 ENCOUNTER — APPOINTMENT (OUTPATIENT)
Dept: SURGERY | Facility: CLINIC | Age: 58
End: 2018-01-29
Payer: COMMERCIAL

## 2018-01-29 VITALS
WEIGHT: 302 LBS | BODY MASS INDEX: 40.02 KG/M2 | DIASTOLIC BLOOD PRESSURE: 84 MMHG | RESPIRATION RATE: 15 BRPM | TEMPERATURE: 98 F | HEIGHT: 73 IN | SYSTOLIC BLOOD PRESSURE: 162 MMHG | OXYGEN SATURATION: 98 % | HEART RATE: 60 BPM

## 2018-01-29 PROCEDURE — 99213 OFFICE O/P EST LOW 20 MIN: CPT

## 2018-04-10 ENCOUNTER — NON-APPOINTMENT (OUTPATIENT)
Age: 58
End: 2018-04-10

## 2018-04-10 ENCOUNTER — APPOINTMENT (OUTPATIENT)
Dept: CARDIOLOGY | Facility: CLINIC | Age: 58
End: 2018-04-10
Payer: COMMERCIAL

## 2018-04-10 VITALS
BODY MASS INDEX: 38.83 KG/M2 | HEIGHT: 73 IN | RESPIRATION RATE: 14 BRPM | HEART RATE: 74 BPM | SYSTOLIC BLOOD PRESSURE: 148 MMHG | WEIGHT: 293 LBS | DIASTOLIC BLOOD PRESSURE: 80 MMHG

## 2018-04-10 PROCEDURE — 93000 ELECTROCARDIOGRAM COMPLETE: CPT

## 2018-04-10 PROCEDURE — 99214 OFFICE O/P EST MOD 30 MIN: CPT

## 2018-04-11 ENCOUNTER — MEDICATION RENEWAL (OUTPATIENT)
Age: 58
End: 2018-04-11

## 2018-07-13 ENCOUNTER — APPOINTMENT (OUTPATIENT)
Dept: SURGERY | Facility: CLINIC | Age: 58
End: 2018-07-13
Payer: COMMERCIAL

## 2018-07-13 VITALS
SYSTOLIC BLOOD PRESSURE: 169 MMHG | WEIGHT: 291 LBS | HEART RATE: 62 BPM | DIASTOLIC BLOOD PRESSURE: 96 MMHG | OXYGEN SATURATION: 99 % | HEIGHT: 73 IN | BODY MASS INDEX: 38.57 KG/M2 | RESPIRATION RATE: 15 BRPM | TEMPERATURE: 98 F

## 2018-07-13 PROCEDURE — 99213 OFFICE O/P EST LOW 20 MIN: CPT

## 2018-07-16 PROBLEM — I48.0 PAROXYSMAL ATRIAL FIBRILLATION: Chronic | Status: ACTIVE | Noted: 2017-10-13

## 2018-09-04 ENCOUNTER — APPOINTMENT (OUTPATIENT)
Dept: CARDIOLOGY | Facility: CLINIC | Age: 58
End: 2018-09-04

## 2018-11-26 ENCOUNTER — APPOINTMENT (OUTPATIENT)
Dept: SURGERY | Facility: CLINIC | Age: 58
End: 2018-11-26

## 2019-01-03 PROBLEM — K21.9 GASTRO-ESOPHAGEAL REFLUX DISEASE WITHOUT ESOPHAGITIS: Chronic | Status: ACTIVE | Noted: 2017-10-13

## 2019-01-03 PROBLEM — G47.33 OBSTRUCTIVE SLEEP APNEA (ADULT) (PEDIATRIC): Chronic | Status: ACTIVE | Noted: 2017-10-13

## 2019-01-03 PROBLEM — I10 ESSENTIAL (PRIMARY) HYPERTENSION: Chronic | Status: ACTIVE | Noted: 2017-10-13

## 2019-01-03 PROBLEM — E55.9 VITAMIN D DEFICIENCY, UNSPECIFIED: Chronic | Status: ACTIVE | Noted: 2017-10-13

## 2019-01-03 PROBLEM — E66.01 MORBID (SEVERE) OBESITY DUE TO EXCESS CALORIES: Chronic | Status: ACTIVE | Noted: 2017-10-13

## 2019-01-03 PROBLEM — F41.9 ANXIETY DISORDER, UNSPECIFIED: Chronic | Status: ACTIVE | Noted: 2017-10-13

## 2019-01-07 ENCOUNTER — NON-APPOINTMENT (OUTPATIENT)
Age: 59
End: 2019-01-07

## 2019-01-07 ENCOUNTER — APPOINTMENT (OUTPATIENT)
Dept: CARDIOLOGY | Facility: CLINIC | Age: 59
End: 2019-01-07
Payer: COMMERCIAL

## 2019-01-07 VITALS
DIASTOLIC BLOOD PRESSURE: 80 MMHG | OXYGEN SATURATION: 98 % | SYSTOLIC BLOOD PRESSURE: 150 MMHG | WEIGHT: 305 LBS | BODY MASS INDEX: 40.24 KG/M2 | HEART RATE: 56 BPM

## 2019-01-07 DIAGNOSIS — K21.9 GASTRO-ESOPHAGEAL REFLUX DISEASE W/OUT ESOPHAGITIS: ICD-10-CM

## 2019-01-07 PROCEDURE — 93000 ELECTROCARDIOGRAM COMPLETE: CPT

## 2019-01-07 PROCEDURE — 99214 OFFICE O/P EST MOD 30 MIN: CPT

## 2019-01-07 NOTE — ASSESSMENT
[FreeTextEntry1] : Hypertension\par \par Palpitations, single episode of PAF in past.\par \par Gastroesophageal reflux disease.\par \par Obesity, s/p bariatric surgery (gastric sleeve, repair of paraesophageal hernia 10/24/17).

## 2019-01-07 NOTE — PHYSICAL EXAM
[Normal Appearance] : normal appearance [General Appearance - In No Acute Distress] : no acute distress [Normal Conjunctiva] : the conjunctiva exhibited no abnormalities [Eyelids - No Xanthelasma] : the eyelids demonstrated no xanthelasmas [Normal Jugular Venous A Waves Present] : normal jugular venous A waves present [Normal Jugular Venous V Waves Present] : normal jugular venous V waves present [No Jugular Venous Raymundo A Waves] : no jugular venous raymundo A waves [Respiration, Rhythm And Depth] : normal respiratory rhythm and effort [Auscultation Breath Sounds / Voice Sounds] : lungs were clear to auscultation bilaterally [Heart Rate And Rhythm] : heart rate and rhythm were normal [Murmurs] : no murmurs present [Bowel Sounds] : normal bowel sounds [Abnormal Walk] : normal gait [Nail Clubbing] : no clubbing of the fingernails [Cyanosis, Localized] : no localized cyanosis [Skin Color & Pigmentation] : normal skin color and pigmentation [] : no rash [No Venous Stasis] : no venous stasis [Oriented To Time, Place, And Person] : oriented to person, place, and time [Impaired Insight] : insight and judgment were intact [Affect] : the affect was normal [Mood] : the mood was normal [FreeTextEntry1] : No edema. Pulses are 2+ in the upper and lower extremities.

## 2019-01-07 NOTE — DISCUSSION/SUMMARY
[FreeTextEntry1] : Hypertension - mildly elevated by office reading today, has been higher when checked at home.  Likely reflects that he stopped taking valsartan; will send in new Rx for this as well as his other meds.\par \par Palpitations - remote, isolated episode of PAF previously; no recurrence -- continue current dose of metoprolol.\par \par Sent in new Rx for bupropion and pantoprazole at his request.\par \par Obesity, s/p bariatric surgery.  Has regained some weight over the holidays; is aware that he needs to be attentive to his diet and resume effort at weight loss.\par \par He will return here for a followup visit in 1 months.

## 2019-01-07 NOTE — HISTORY OF PRESENT ILLNESS
[FreeTextEntry1] : I last saw him in April.  Since that time, he has been well overall.  He continues his usual activities and reports no cardiac symptoms.  There have been no palpitations since his last visit.  He describes no episodes of exertional chest discomfort or significant dyspnea. There have been no episodes of orthopnea or PND. He describes no spells of lightheadedness or syncopal events.\par \par He checks his BP with an automated arm cuff (the cuff itself is a large one), and of late readings have been as high as 180/100.  He has been experiencing periodic headaches, and is concerned they could be related to he elevated readings.\par \par As I review his meds, for unclear reasons he has not been taking valsartan; he remains on the other meds.

## 2019-01-07 NOTE — REASON FOR VISIT
[FreeTextEntry1] : Omari Wallace returns for a followup visit regarding elevated BP readings of late.  He also has a hx. of palpitations and an isolated, remote episode of PAF.

## 2019-01-13 ENCOUNTER — RX RENEWAL (OUTPATIENT)
Age: 59
End: 2019-01-13

## 2019-01-14 ENCOUNTER — RX RENEWAL (OUTPATIENT)
Age: 59
End: 2019-01-14

## 2019-01-14 RX ORDER — PANTOPRAZOLE 40 MG/1
40 TABLET, DELAYED RELEASE ORAL DAILY
Qty: 90 | Refills: 1 | Status: DISCONTINUED | COMMUNITY
Start: 2017-07-06 | End: 2019-01-14

## 2019-02-14 ENCOUNTER — APPOINTMENT (OUTPATIENT)
Dept: CARDIOLOGY | Facility: CLINIC | Age: 59
End: 2019-02-14

## 2019-02-14 NOTE — HISTORY OF PRESENT ILLNESS
[FreeTextEntry1] : I last saw him in January.  Since that time,\par \par \par  he has been well overall.  He continues his usual activities and reports no cardiac symptoms.  There have been no palpitations since his last visit.  He describes no episodes of exertional chest discomfort or significant dyspnea. There have been no episodes of orthopnea or PND. He describes no spells of lightheadedness or syncopal events.\par \par He checks his BP with an automated arm cuff (the cuff itself is a large one), and of late readings have been as high as 180/100.  He has been experiencing periodic headaches, and is concerned they could be related to he elevated readings.\par \par As I review his meds, for unclear reasons he has not been taking valsartan; he remains on the other meds.

## 2019-02-14 NOTE — PHYSICAL EXAM
[Normal Appearance] : normal appearance [General Appearance - In No Acute Distress] : no acute distress [Normal Conjunctiva] : the conjunctiva exhibited no abnormalities [Eyelids - No Xanthelasma] : the eyelids demonstrated no xanthelasmas [Normal Jugular Venous A Waves Present] : normal jugular venous A waves present [Normal Jugular Venous V Waves Present] : normal jugular venous V waves present [No Jugular Venous Raymundo A Waves] : no jugular venous raymundo A waves [Respiration, Rhythm And Depth] : normal respiratory rhythm and effort [Auscultation Breath Sounds / Voice Sounds] : lungs were clear to auscultation bilaterally [Heart Rate And Rhythm] : heart rate and rhythm were normal [Murmurs] : no murmurs present [Bowel Sounds] : normal bowel sounds [Abnormal Walk] : normal gait [Nail Clubbing] : no clubbing of the fingernails [Cyanosis, Localized] : no localized cyanosis [FreeTextEntry1] : No edema. Pulses are 2+ in the upper and lower extremities. [Skin Color & Pigmentation] : normal skin color and pigmentation [] : no rash [No Venous Stasis] : no venous stasis [Oriented To Time, Place, And Person] : oriented to person, place, and time [Impaired Insight] : insight and judgment were intact [Affect] : the affect was normal [Mood] : the mood was normal

## 2019-02-14 NOTE — REASON FOR VISIT
[FreeTextEntry1] : Omari Wallace returns for a followup visit regarding HTN, hx. of palpitations and an isolated, remote episode of PAF.

## 2019-10-07 ENCOUNTER — MEDICATION RENEWAL (OUTPATIENT)
Age: 59
End: 2019-10-07

## 2019-10-17 ENCOUNTER — TRANSCRIPTION ENCOUNTER (OUTPATIENT)
Age: 59
End: 2019-10-17

## 2019-11-15 ENCOUNTER — APPOINTMENT (OUTPATIENT)
Dept: CARDIOLOGY | Facility: CLINIC | Age: 59
End: 2019-11-15
Payer: COMMERCIAL

## 2019-11-15 ENCOUNTER — NON-APPOINTMENT (OUTPATIENT)
Age: 59
End: 2019-11-15

## 2019-11-15 VITALS
SYSTOLIC BLOOD PRESSURE: 133 MMHG | RESPIRATION RATE: 14 BRPM | HEIGHT: 73 IN | BODY MASS INDEX: 41.08 KG/M2 | WEIGHT: 310 LBS | OXYGEN SATURATION: 95 % | HEART RATE: 57 BPM | DIASTOLIC BLOOD PRESSURE: 79 MMHG

## 2019-11-15 PROCEDURE — 99214 OFFICE O/P EST MOD 30 MIN: CPT

## 2019-11-15 PROCEDURE — 93000 ELECTROCARDIOGRAM COMPLETE: CPT

## 2019-11-15 NOTE — REASON FOR VISIT
[FreeTextEntry1] : \par Omari Wallace returns for a followup visit regarding elevated BP readings of late.  He also has a hx. of palpitations and an isolated, remote episode of PAF.

## 2019-11-15 NOTE — ASSESSMENT
[FreeTextEntry1] : Palpitations, single episode of PAF in past.\par \par Hypertension\par \par Gastroesophageal reflux disease.\par \par Obesity, s/p bariatric surgery (gastric sleeve, repair of paraesophageal hernia 10/24/17).

## 2019-11-15 NOTE — HISTORY OF PRESENT ILLNESS
[FreeTextEntry1] : I last saw him in January.  Since that time, he was well until recently, when he was aware of palpitations on 2 occasions.  In each instance, he was aware of the sudden onset of a rapid heartbeat, lasting no more than 1-2 minutes.  This was associated with a mild sense of breathlessness, but no chest discomfort.  He reports no lightheadedness.\par \par He continues his customary activities.  Overall, he has lost 60 pounds since his bariatric surgery.

## 2019-11-15 NOTE — DISCUSSION/SUMMARY
[FreeTextEntry1] : \par Palpitations -likely due to episodes of PAF, as this has been seen in the past.  I advised him to increase the dose of metoprolol, to take 100 mg in the morning and 50 mg in the evening.\par \par As the MALLY VASC is 1, I advised him to start aspirin at a dose of 81 mg each day.  Anticoagulation does not appear to be warranted at this time.\par \par Hypertension -blood pressure is well controlled today, and he will continue his current antihypertensive regimen.  \par \par He has been under increased stress, which I think may also be contributing to his palpitations.  I gave him a new prescription for for alprazolam.  I advised that he would probably benefit from seeing a psychiatrist.  He does see a psychologist regularly, but I think he would likely benefit from a medication for his anxiety symptoms, which a psychiatrist would be able to provide.  I gave him the names and phone numbers of Dr. Jorge Yanes and Dr. Jorge Alberto Adam, and also suggested that he could check with Dr. Malone in this regard.  \par \par I asked him to return in 3-4 weeks, and we will arrange for an echocardiogram at that time to reassess cardiac structure and function.  If palpitations persist despite a higher beta-blocker dose, I will arrange for an event monitor.

## 2019-11-17 LAB
ALBUMIN SERPL ELPH-MCNC: 4.7 G/DL
ALP BLD-CCNC: 65 U/L
ALT SERPL-CCNC: 15 U/L
ANION GAP SERPL CALC-SCNC: 12 MMOL/L
AST SERPL-CCNC: 18 U/L
BASOPHILS # BLD AUTO: 0.04 K/UL
BASOPHILS NFR BLD AUTO: 0.6 %
BILIRUB SERPL-MCNC: 0.5 MG/DL
BUN SERPL-MCNC: 15 MG/DL
CALCIUM SERPL-MCNC: 9.5 MG/DL
CHLORIDE SERPL-SCNC: 104 MMOL/L
CHOLEST SERPL-MCNC: 156 MG/DL
CHOLEST/HDLC SERPL: 2.4 RATIO
CO2 SERPL-SCNC: 25 MMOL/L
CREAT SERPL-MCNC: 1.03 MG/DL
EOSINOPHIL # BLD AUTO: 0.12 K/UL
EOSINOPHIL NFR BLD AUTO: 1.7 %
GLUCOSE SERPL-MCNC: 99 MG/DL
HCT VFR BLD CALC: 48.9 %
HDLC SERPL-MCNC: 66 MG/DL
HGB BLD-MCNC: 15.8 G/DL
IMM GRANULOCYTES NFR BLD AUTO: 0.3 %
LDLC SERPL CALC-MCNC: 73 MG/DL
LDLC SERPL DIRECT ASSAY-MCNC: 83 MG/DL
LYMPHOCYTES # BLD AUTO: 2.2 K/UL
LYMPHOCYTES NFR BLD AUTO: 32 %
MAN DIFF?: NORMAL
MCHC RBC-ENTMCNC: 30.2 PG
MCHC RBC-ENTMCNC: 32.3 GM/DL
MCV RBC AUTO: 93.5 FL
MONOCYTES # BLD AUTO: 0.59 K/UL
MONOCYTES NFR BLD AUTO: 8.6 %
NEUTROPHILS # BLD AUTO: 3.9 K/UL
NEUTROPHILS NFR BLD AUTO: 56.8 %
PLATELET # BLD AUTO: 200 K/UL
POTASSIUM SERPL-SCNC: 5 MMOL/L
PROT SERPL-MCNC: 6.9 G/DL
RBC # BLD: 5.23 M/UL
RBC # FLD: 13.3 %
SODIUM SERPL-SCNC: 141 MMOL/L
TRIGL SERPL-MCNC: 84 MG/DL
TSH SERPL-ACNC: 1.85 UIU/ML
WBC # FLD AUTO: 6.87 K/UL

## 2020-10-25 ENCOUNTER — RX RENEWAL (OUTPATIENT)
Age: 60
End: 2020-10-25

## 2021-02-02 ENCOUNTER — TRANSCRIPTION ENCOUNTER (OUTPATIENT)
Age: 61
End: 2021-02-02

## 2021-03-07 ENCOUNTER — TRANSCRIPTION ENCOUNTER (OUTPATIENT)
Age: 61
End: 2021-03-07

## 2021-07-26 ENCOUNTER — NON-APPOINTMENT (OUTPATIENT)
Age: 61
End: 2021-07-26

## 2021-07-26 ENCOUNTER — APPOINTMENT (OUTPATIENT)
Dept: CARDIOLOGY | Facility: CLINIC | Age: 61
End: 2021-07-26
Payer: COMMERCIAL

## 2021-07-26 VITALS
BODY MASS INDEX: 41.75 KG/M2 | HEIGHT: 73 IN | DIASTOLIC BLOOD PRESSURE: 88 MMHG | OXYGEN SATURATION: 98 % | WEIGHT: 315 LBS | RESPIRATION RATE: 17 BRPM | HEART RATE: 128 BPM | SYSTOLIC BLOOD PRESSURE: 126 MMHG

## 2021-07-26 PROCEDURE — 99215 OFFICE O/P EST HI 40 MIN: CPT

## 2021-07-26 PROCEDURE — 93000 ELECTROCARDIOGRAM COMPLETE: CPT | Mod: 59

## 2021-07-26 PROCEDURE — 99072 ADDL SUPL MATRL&STAF TM PHE: CPT

## 2021-07-26 PROCEDURE — 93224 XTRNL ECG REC UP TO 48 HRS: CPT

## 2021-07-26 PROCEDURE — 36415 COLL VENOUS BLD VENIPUNCTURE: CPT

## 2021-07-27 LAB
ALBUMIN SERPL ELPH-MCNC: 3.8 G/DL
ALP BLD-CCNC: 522 U/L
ALT SERPL-CCNC: 256 U/L
ANION GAP SERPL CALC-SCNC: 16 MMOL/L
AST SERPL-CCNC: 130 U/L
BILIRUB SERPL-MCNC: 14.8 MG/DL
BUN SERPL-MCNC: 24 MG/DL
CALCIUM SERPL-MCNC: 9.3 MG/DL
CHLORIDE SERPL-SCNC: 101 MMOL/L
CO2 SERPL-SCNC: 20 MMOL/L
CREAT SERPL-MCNC: 0.98 MG/DL
GLUCOSE SERPL-MCNC: 114 MG/DL
POTASSIUM SERPL-SCNC: 4 MMOL/L
PROT SERPL-MCNC: 5.9 G/DL
SODIUM SERPL-SCNC: 137 MMOL/L
TSH SERPL-ACNC: 2.72 UIU/ML

## 2021-07-27 NOTE — ASSESSMENT
[FreeTextEntry1] : PAF with recent recurrences, prior to surgery and again today.\par \par Hypertension\par \par Gastroesophageal reflux disease.\par \par Obesity, s/p bariatric surgery (gastric sleeve, repair of paraesophageal hernia 10/24/17).\par \par S/P recent microdiscectomy for RLE sciatica

## 2021-07-27 NOTE — HISTORY OF PRESENT ILLNESS
[FreeTextEntry1] : I last saw him in Nov. of 2019.  He recently required microdiscectomy of the lower back for sciatic sxs. including pain and numbness in the RLE.  Pre-op, an episode of PAF was noted which apparently resolved.  Transient elevation of bilirubin was also noted; w/u showed gallbladder enlargement, but no apparently obstruction.  On sono and CT imaging, no dz. of the liver or pancreas was seen.\par \par As his bilibrubin normalized and he remained in SR, surgery was performed last week.  He tells me that AF recurred during surgery; the surgeon was able to complete the case.\par \par As Mr. Gonsalez presents today, he is not ware of palpitations at present.  He reports no BLACKWELL and no chest discomfort.  He reports no lightheadedness.\par \par He wife noticed recurrent scleral icterus over the past 2 days.

## 2021-07-27 NOTE — DISCUSSION/SUMMARY
[FreeTextEntry1] : 1. PAF -\par - continue metoprolol at current dose\par - will start digoxin - instructed in 1 mg. loading dose today; 0.25 mg. qd thereafter.\par \par 2.  I would prefer to start A/C as he may require cardioversion at some point, but given evidence of jaundice and uncertain liver function, will not do this at present.  Advised him to resume aspirin at a dose of 162 mg each day.\par \par 3.  Hypertension -blood pressure remains controlled - continue metoprolol and valsartan.  \par \par 4.  He will go home with a Holter monitor today.\par \par 5.  Will check a CMP.\par \par 6. He will return this Friday for f/u.

## 2021-07-29 PROBLEM — E80.6 HYPERBILIRUBINEMIA: Status: ACTIVE | Noted: 2021-07-29

## 2021-07-29 NOTE — DISCUSSION/SUMMARY
[FreeTextEntry1] : PRELIMINARY NOTE\par \par 1. PAF -\par - continue metoprolol at current dose\par - will start digoxin - instructed in 1 mg. loading dose today; 0.25 mg. qd thereafter.\par \par 2.  I would prefer to start A/C as he may require cardioversion at some point, but given evidence of jaundice and uncertain liver function, will not do this at present.  Advised him to resume aspirin at a dose of 162 mg each day.\par \par 3.  Hypertension -blood pressure remains controlled - continue metoprolol and valsartan.  \par \par 4.  He will go home with a Holter monitor today.\par \par 5.  Will check a CMP.\par \par 6. He will return this Friday for f/u.

## 2021-07-29 NOTE — REASON FOR VISIT
[FreeTextEntry1] : PRELIMINARY NOTE\par \par Omari Wallace returns for a followup visit regarding PAF and HTN.

## 2021-07-29 NOTE — HISTORY OF PRESENT ILLNESS
[FreeTextEntry1] : I last saw him earlier this week due to recurrence of A. bit.   Recurrent elevation of bilirubin was also noted; recent w/u for transient hyperbilirubinemia showed gallbladder enlargement, but no apparentlobstruction.  On sono and CT imaging, no dz. of the liver or pancreas was seen.\par \par Digoxin was added at the recent visit to better control his HR.  No A/C was started due to the elevated bili and concern of liver dysfunction.\par \par As Mr. Gonsalez presents today,\par \par \par \par  he is not ware of palpitations at present.  He reports no BLACKWELL and no chest discomfort.  He reports no lightheadedness.\par

## 2021-07-30 ENCOUNTER — APPOINTMENT (OUTPATIENT)
Dept: CARDIOLOGY | Facility: CLINIC | Age: 61
End: 2021-07-30

## 2021-07-30 DIAGNOSIS — E80.6 OTHER DISORDERS OF BILIRUBIN METABOLISM: ICD-10-CM

## 2021-08-15 NOTE — PHYSICAL EXAM
[Normal Appearance] : normal appearance [General Appearance - In No Acute Distress] : no acute distress [Normal Conjunctiva] : the conjunctiva exhibited no abnormalities [Eyelids - No Xanthelasma] : the eyelids demonstrated no xanthelasmas [Normal Jugular Venous A Waves Present] : normal jugular venous A waves present [Normal Jugular Venous V Waves Present] : normal jugular venous V waves present [No Jugular Venous Raymundo A Waves] : no jugular venous raymundo A waves [Respiration, Rhythm And Depth] : normal respiratory rhythm and effort [Auscultation Breath Sounds / Voice Sounds] : lungs were clear to auscultation bilaterally [Murmurs] : no murmurs present [Heart Rate And Rhythm] : heart rate and rhythm were normal [Bowel Sounds] : normal bowel sounds [Abnormal Walk] : normal gait [Nail Clubbing] : no clubbing of the fingernails [Cyanosis, Localized] : no localized cyanosis [FreeTextEntry1] : No edema. Pulses are 2+ in the upper and lower extremities. [Skin Color & Pigmentation] : normal skin color and pigmentation [] : no rash [No Venous Stasis] : no venous stasis [Impaired Insight] : insight and judgment were intact [Oriented To Time, Place, And Person] : oriented to person, place, and time [Affect] : the affect was normal [Mood] : the mood was normal

## 2021-08-15 NOTE — HISTORY OF PRESENT ILLNESS
[FreeTextEntry1] : I last saw him lat month due to recurrence of A. fib.   At that time, recurrent elevation of bilirubin was also noted; recent w/u for transient hyperbilirubinemia had showed gallbladder enlargement, but no apparent obstruction.  On sono and CT imaging, no dz. of the liver or pancreas was seen.\par \par Digoxin was added at the recent visit to better control his HR.  No A/C was started due to the elevated bili and concern of liver dysfunction.\par \par As Mr. Gonsalez returns today,\par \par \par \par  he is not ware of palpitations at present.  He reports no BLACKWELL and no chest discomfort.  He reports no lightheadedness.\par

## 2021-08-16 ENCOUNTER — APPOINTMENT (OUTPATIENT)
Dept: CARDIOLOGY | Facility: CLINIC | Age: 61
End: 2021-08-16

## 2021-08-16 NOTE — DISCHARGE NOTE ADULT - PROVIDER TOKENS
18 y/o male presenting with multiple concerns. patient states for 3 days he has been dieting, mostly trying to eat friut and has noted dizziness on standing and an associated headache. has not taken anything for symptoms. additionally noting intermittent palpitations during this time. lastly states he had long car ride (10+ hours) and now feels he has right sided calf pain. no chest pain or sob. no fever/chills/n/v. no cough/uri sx.
TOKEN:'[id number]'

## 2021-08-17 ENCOUNTER — NON-APPOINTMENT (OUTPATIENT)
Age: 61
End: 2021-08-17

## 2021-08-20 LAB
ALBUMIN SERPL ELPH-MCNC: 3.3 G/DL
ALP BLD-CCNC: 186 U/L
ALT SERPL-CCNC: 104 U/L
ANION GAP SERPL CALC-SCNC: 9 MMOL/L
AST SERPL-CCNC: 84 U/L
BASOPHILS # BLD AUTO: 0.03 K/UL
BASOPHILS NFR BLD AUTO: 0.4 %
BILIRUB SERPL-MCNC: 5.2 MG/DL
BUN SERPL-MCNC: 12 MG/DL
CALCIUM SERPL-MCNC: 8.6 MG/DL
CHLORIDE SERPL-SCNC: 106 MMOL/L
CHOLEST SERPL-MCNC: 159 MG/DL
CO2 SERPL-SCNC: 28 MMOL/L
CREAT SERPL-MCNC: 0.71 MG/DL
DIGOXIN SERPL-MCNC: 0.7 NG/ML
EOSINOPHIL # BLD AUTO: 0.17 K/UL
EOSINOPHIL NFR BLD AUTO: 2.2 %
GLUCOSE SERPL-MCNC: 107 MG/DL
HCT VFR BLD CALC: 38.8 %
HDLC SERPL-MCNC: 15 MG/DL
HGB BLD-MCNC: 12.2 G/DL
IMM GRANULOCYTES NFR BLD AUTO: 0.5 %
LDLC SERPL CALC-MCNC: 101 MG/DL
LDLC SERPL DIRECT ASSAY-MCNC: 81 MG/DL
LYMPHOCYTES # BLD AUTO: 1.1 K/UL
LYMPHOCYTES NFR BLD AUTO: 14.5 %
MAN DIFF?: NORMAL
MCHC RBC-ENTMCNC: 31 PG
MCHC RBC-ENTMCNC: 31.4 GM/DL
MCV RBC AUTO: 98.5 FL
MONOCYTES # BLD AUTO: 0.92 K/UL
MONOCYTES NFR BLD AUTO: 12.1 %
NEUTROPHILS # BLD AUTO: 5.35 K/UL
NEUTROPHILS NFR BLD AUTO: 70.3 %
NONHDLC SERPL-MCNC: 144 MG/DL
PLATELET # BLD AUTO: 285 K/UL
POTASSIUM SERPL-SCNC: 4.1 MMOL/L
PROT SERPL-MCNC: 5.3 G/DL
RBC # BLD: 3.94 M/UL
RBC # FLD: 17.6 %
SODIUM SERPL-SCNC: 143 MMOL/L
TRIGL SERPL-MCNC: 217 MG/DL
WBC # FLD AUTO: 7.61 K/UL

## 2021-08-26 ENCOUNTER — NON-APPOINTMENT (OUTPATIENT)
Age: 61
End: 2021-08-26

## 2021-08-26 ENCOUNTER — APPOINTMENT (OUTPATIENT)
Dept: CARDIOLOGY | Facility: CLINIC | Age: 61
End: 2021-08-26
Payer: COMMERCIAL

## 2021-08-26 VITALS
HEIGHT: 73 IN | DIASTOLIC BLOOD PRESSURE: 85 MMHG | RESPIRATION RATE: 14 BRPM | SYSTOLIC BLOOD PRESSURE: 140 MMHG | WEIGHT: 298 LBS | HEART RATE: 88 BPM | BODY MASS INDEX: 39.49 KG/M2 | OXYGEN SATURATION: 97 %

## 2021-08-26 PROCEDURE — 93000 ELECTROCARDIOGRAM COMPLETE: CPT

## 2021-08-26 PROCEDURE — 99214 OFFICE O/P EST MOD 30 MIN: CPT

## 2021-08-26 NOTE — PHYSICAL EXAM
[Normal Appearance] : normal appearance [Normal Conjunctiva] : the conjunctiva exhibited no abnormalities [General Appearance - In No Acute Distress] : no acute distress [Eyelids - No Xanthelasma] : the eyelids demonstrated no xanthelasmas [Normal Jugular Venous A Waves Present] : normal jugular venous A waves present [Normal Jugular Venous V Waves Present] : normal jugular venous V waves present [No Jugular Venous Raymundo A Waves] : no jugular venous raymundo A waves [Auscultation Breath Sounds / Voice Sounds] : lungs were clear to auscultation bilaterally [Respiration, Rhythm And Depth] : normal respiratory rhythm and effort [Heart Rate And Rhythm] : heart rate and rhythm were normal [Murmurs] : no murmurs present [Bowel Sounds] : normal bowel sounds [Abnormal Walk] : normal gait [Nail Clubbing] : no clubbing of the fingernails [Cyanosis, Localized] : no localized cyanosis [] : no rash [Skin Color & Pigmentation] : normal skin color and pigmentation [No Venous Stasis] : no venous stasis [Oriented To Time, Place, And Person] : oriented to person, place, and time [Impaired Insight] : insight and judgment were intact [Affect] : the affect was normal [Mood] : the mood was normal [FreeTextEntry1] : No edema. Pulses are 2+ in the upper and lower extremities.

## 2021-08-26 NOTE — HISTORY OF PRESENT ILLNESS
[FreeTextEntry1] : I last saw him recently due to recurrence of A. fib.   \par \par He was most recently seen at [facility] [place]. He was brought to the OR with hopes of doing a Whipple procedure, but once opened, he tells me that there was evidence of malignant disease affecting the liver. The case was stopped. Once healed, he will be seeing an oncologist with plan for medical treatment to the tumor, and possibly surgical intervention depending upon his response. Apparently, a biliary stent was placed.  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 Because of the latter, ere recently had blood work checked for a digoxin level and a chemistry profile; the digoxin level was normal and bilirubin level as improved. \par \par He is not ware of palpitations at present.  He reports no BLACKWELL and no chest discomfort.  He reports no lightheadedness.  He is tolerating A/C.

## 2021-08-26 NOTE — DISCUSSION/SUMMARY
[FreeTextEntry1] : \par 1. Persistent AF -\par - continue metoprolol and digxoin at current dose.\par \par 2.  Continue A/C; he will d/c ASA.\par \par 3.  Hypertension -blood pressure remains controlled - continue metoprolol and valsartan.  \par \par He will return in one month; will consider arrange cardioversion depending on his course.

## 2021-08-30 ENCOUNTER — NON-APPOINTMENT (OUTPATIENT)
Age: 61
End: 2021-08-30

## 2021-09-21 ENCOUNTER — RESULT CHARGE (OUTPATIENT)
Age: 61
End: 2021-09-21

## 2021-09-23 ENCOUNTER — APPOINTMENT (OUTPATIENT)
Dept: CARDIOLOGY | Facility: CLINIC | Age: 61
End: 2021-09-23
Payer: COMMERCIAL

## 2021-09-23 ENCOUNTER — NON-APPOINTMENT (OUTPATIENT)
Age: 61
End: 2021-09-23

## 2021-09-23 VITALS
OXYGEN SATURATION: 96 % | HEART RATE: 81 BPM | WEIGHT: 284 LBS | BODY MASS INDEX: 37.47 KG/M2 | SYSTOLIC BLOOD PRESSURE: 98 MMHG | DIASTOLIC BLOOD PRESSURE: 72 MMHG

## 2021-09-23 VITALS — DIASTOLIC BLOOD PRESSURE: 70 MMHG | SYSTOLIC BLOOD PRESSURE: 98 MMHG

## 2021-09-23 DIAGNOSIS — G47.00 INSOMNIA, UNSPECIFIED: ICD-10-CM

## 2021-09-23 PROCEDURE — 93000 ELECTROCARDIOGRAM COMPLETE: CPT

## 2021-09-23 PROCEDURE — 99215 OFFICE O/P EST HI 40 MIN: CPT

## 2021-09-23 NOTE — DISCUSSION/SUMMARY
[FreeTextEntry1] : \par 1. Persistent AF -\par His heart rate remains well controlled, I advised him to continue metoprolol and digoxin at current dose.\par \par 2.  Continue A/C for atrial fibrillation.\par \par 3.  Hypertension - blood pressure is is currently too low in the context of his ongoing chemotherapy.  I advised him to reduce furosemide down to 20 mg each day and to discontinue valsartan.  He will continue on amlodipine and metoprolol at the current dosage.    \par \par 4.  Insomnia - as his liver chemistries are normal, I sent in a new prescription for alprazolam.  I advised him to use it sparingly, ideally no more than twice weekly.\par \par 5.  We reviewed cardioversion.  I would prefer to wait till he has received additional chemotherapy.  I will also speak to the oncologist Dr. Mora, to have a better idea of his treatment plan and prognosis.\par \par I asked her to return here in 6 weeks.  Depending on his course, if cardioversion is appropriate, I will refer her him to electrophysiologist for further management of the atrial fibrillation.

## 2021-09-23 NOTE — HISTORY OF PRESENT ILLNESS
[FreeTextEntry1] : He is currently seeing Dr. 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  He does not describe palpitations.  He reports no exertional chest discomfort or dyspnea.  He reports no orthopnea or PND.\par \par He does describe episodic lightheadedness, and his blood pressure has been lower as he goes for his chemo treatments.\par \par He is tolerating A/C and there have been no bleeding episodes.

## 2021-09-23 NOTE — ASSESSMENT
[FreeTextEntry1] : PAF with recent recurrences, prior to surgery and again today.\par \par Hypertension\par \par Gastroesophageal reflux disease.\par \par Obesity, s/p bariatric surgery (gastric sleeve, repair of paraesophageal hernia 10/24/17).

## 2021-11-11 NOTE — OBJECTIVE
Return to the ER for further concerns or worsening symptoms  Follow up with your primary care physician in 1-2 days [History reviewed] : History reviewed. [Medications and Allergies reviewed] : Medications and allergies reviewed.

## 2021-11-12 ENCOUNTER — APPOINTMENT (OUTPATIENT)
Dept: CARDIOLOGY | Facility: CLINIC | Age: 61
End: 2021-11-12
Payer: COMMERCIAL

## 2021-11-12 ENCOUNTER — NON-APPOINTMENT (OUTPATIENT)
Age: 61
End: 2021-11-12

## 2021-11-12 VITALS
WEIGHT: 306 LBS | DIASTOLIC BLOOD PRESSURE: 76 MMHG | HEART RATE: 94 BPM | BODY MASS INDEX: 40.56 KG/M2 | SYSTOLIC BLOOD PRESSURE: 138 MMHG | OXYGEN SATURATION: 96 % | RESPIRATION RATE: 15 BRPM | HEIGHT: 73 IN

## 2021-11-12 PROCEDURE — 93000 ELECTROCARDIOGRAM COMPLETE: CPT

## 2021-11-12 PROCEDURE — 99214 OFFICE O/P EST MOD 30 MIN: CPT

## 2021-11-12 NOTE — ASSESSMENT
[FreeTextEntry1] : Persistent AF with recent recurrences, prior to surgery and again today.\par \par Hypertension\par \par Gastroesophageal reflux disease.\par \par Obesity, s/p bariatric surgery (gastric sleeve, repair of paraesophageal hernia 10/24/17).

## 2021-11-12 NOTE — DISCUSSION/SUMMARY
[FreeTextEntry1] : 1. Persistent AF -\par - Heart rate remains adequately controlled; continue metoprolol and digoxin at current dose.\par - given BLACKWELL, will arrange cardioversion, likely week of Dec. 13 as he won't get chemo that week.  Explained the this may require a LOUIS pre-procedure.\par \par 2.  Continue A/C for atrial fibrillation.\par \par 3.  Hypertension - blood pressure is in good range today.  Given LE edema, will increase furosemide back to a full 40 mg. tab each day.  Labs at [facility] showed a K of 3.2; will add KCL also.\par \par 4.  He will continue on amlodipine for his BP, as well as Lasix and Toprol.    \par \par 5.  We contact Dr. Mora prior to the cardioversion..\par \par He recent had blood work at [facility]; he will e-mail me the results so that I can put them into [employer].\par \par He will return in 4 weeks.

## 2021-11-12 NOTE — HISTORY OF PRESENT ILLNESS
[FreeTextEntry1] : Since I saw him last, he continues chemo with Dr. Raffy Mora at [facility] (); he is treated on Tuesdays (two Tues. 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  He has received 2 1/2 mos. of what is  planned to be a 9 month treatment course.\par \par From a cardiac standpoint, he does not describe palpitations but is aware of BLACKWELL.  He reports no exertional chest discomfort.  He reports no orthopnea or PND.  He is aware of increased LE edema up to the shins and sometimes the knees.  He does get a fair amount of IV fluid with chemo and is on a lesser dose of Lasix at present.\par \par He is tolerating A/C; there have been no bleeding episodes.

## 2021-12-10 ENCOUNTER — NON-APPOINTMENT (OUTPATIENT)
Age: 61
End: 2021-12-10

## 2021-12-10 ENCOUNTER — APPOINTMENT (OUTPATIENT)
Dept: CARDIOLOGY | Facility: CLINIC | Age: 61
End: 2021-12-10
Payer: COMMERCIAL

## 2021-12-10 VITALS
DIASTOLIC BLOOD PRESSURE: 72 MMHG | BODY MASS INDEX: 40.77 KG/M2 | OXYGEN SATURATION: 99 % | SYSTOLIC BLOOD PRESSURE: 110 MMHG | HEART RATE: 99 BPM | WEIGHT: 309 LBS

## 2021-12-10 PROCEDURE — 93000 ELECTROCARDIOGRAM COMPLETE: CPT

## 2021-12-10 PROCEDURE — 36415 COLL VENOUS BLD VENIPUNCTURE: CPT

## 2021-12-10 PROCEDURE — 99214 OFFICE O/P EST MOD 30 MIN: CPT

## 2021-12-10 RX ORDER — MAGNESIUM OXIDE 241.3 MG/1000MG
400 TABLET ORAL DAILY
Qty: 60 | Refills: 2 | Status: ACTIVE | COMMUNITY

## 2021-12-10 RX ORDER — NIFEDIPINE 90 MG/1
90 TABLET, EXTENDED RELEASE ORAL
Qty: 90 | Refills: 0 | Status: DISCONTINUED | COMMUNITY
Start: 2017-06-09 | End: 2021-12-10

## 2021-12-10 NOTE — DISCUSSION/SUMMARY
[FreeTextEntry1] : 1. Persistent AF -\par - Heart rate remains adequately controlled; continue metoprolol and digoxin at current dosage.\par - Cardioversion is scheduled for Tues. of next week with Dr. Barroso.\par - will check chem profile, as he needed supplementation with IV Mg+ this week.\par \par 2.  Continue A/C for atrial fibrillation.\par \par 3.  Hypertension - blood pressure is in good range today.  \par \par 4.  Given LE edema, will increase furosemide to 60 mg. tab each day.  \par \par 5.  I asked him to recheck at home if he is taking nifedipine; if he is, I will reduce the dose to help with the LE edema. \par \par 35 minutes spent on today's office visit.\par \par He will return in 4 weeks.

## 2021-12-10 NOTE — HISTORY OF PRESENT ILLNESS
[FreeTextEntry1] : He continues chemo with Dr. Raffy Mora at [facility] (); he is treated on Tuesdays (two Tues. 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  His tells me that his most recent CT scans shows regression of dz; no tumor was seen.  He will continue on treatment for three more months per the protocol.\par \par From a cardiac standpoint, he is still aware of BLACKWELL but reports no palps.  He describes no exertional chest discomfort.  He reports no orthopnea or PND.  He still has LE edema up to the shins and sometimes the knees; currently he takes Lasix 40 mg. qd.  He does get a fair amount of IV fluid with his chemo, which he received earlier this week.  Despite my notes, he tells me he is not on Procardia at this time. \par \par He takes the Xarelto conscientiously; he reports no bleeding episodes.

## 2021-12-10 NOTE — ASSESSMENT
[FreeTextEntry1] : Persistent AF.\par \par Hypertension\par \par Gastroesophageal reflux disease.\par \par Obesity, s/p bariatric surgery (gastric sleeve, repair of paraesophageal hernia 10/24/17).\par \par Biliary tract cancer

## 2021-12-11 ENCOUNTER — OUTPATIENT (OUTPATIENT)
Dept: OUTPATIENT SERVICES | Facility: HOSPITAL | Age: 61
LOS: 1 days | End: 2021-12-11
Payer: COMMERCIAL

## 2021-12-11 DIAGNOSIS — Z11.52 ENCOUNTER FOR SCREENING FOR COVID-19: ICD-10-CM

## 2021-12-11 DIAGNOSIS — Z98.890 OTHER SPECIFIED POSTPROCEDURAL STATES: Chronic | ICD-10-CM

## 2021-12-11 LAB — SARS-COV-2 RNA SPEC QL NAA+PROBE: SIGNIFICANT CHANGE UP

## 2021-12-11 PROCEDURE — U0005: CPT

## 2021-12-11 PROCEDURE — C9803: CPT

## 2021-12-11 PROCEDURE — U0003: CPT

## 2021-12-14 ENCOUNTER — OUTPATIENT (OUTPATIENT)
Dept: OUTPATIENT SERVICES | Facility: HOSPITAL | Age: 61
LOS: 1 days | End: 2021-12-14
Payer: COMMERCIAL

## 2021-12-14 VITALS
DIASTOLIC BLOOD PRESSURE: 56 MMHG | OXYGEN SATURATION: 96 % | HEART RATE: 88 BPM | SYSTOLIC BLOOD PRESSURE: 113 MMHG | WEIGHT: 309.09 LBS | TEMPERATURE: 98 F | RESPIRATION RATE: 18 BRPM | HEIGHT: 73 IN

## 2021-12-14 VITALS
DIASTOLIC BLOOD PRESSURE: 58 MMHG | HEART RATE: 71 BPM | SYSTOLIC BLOOD PRESSURE: 127 MMHG | RESPIRATION RATE: 18 BRPM | OXYGEN SATURATION: 98 % | TEMPERATURE: 98 F

## 2021-12-14 DIAGNOSIS — Z98.890 OTHER SPECIFIED POSTPROCEDURAL STATES: Chronic | ICD-10-CM

## 2021-12-14 DIAGNOSIS — I48.91 UNSPECIFIED ATRIAL FIBRILLATION: ICD-10-CM

## 2021-12-14 LAB
ALBUMIN SERPL ELPH-MCNC: 3.5 G/DL
ALP BLD-CCNC: 65 U/L
ALT SERPL-CCNC: 12 U/L
ANION GAP SERPL CALC-SCNC: 12 MMOL/L — SIGNIFICANT CHANGE UP (ref 5–17)
ANION GAP SERPL CALC-SCNC: 13 MMOL/L
APTT BLD: 36.1 SEC — HIGH (ref 27.5–35.5)
AST SERPL-CCNC: 18 U/L
BILIRUB SERPL-MCNC: 0.6 MG/DL
BUN SERPL-MCNC: 38 MG/DL — HIGH (ref 7–23)
BUN SERPL-MCNC: 46 MG/DL
CALCIUM SERPL-MCNC: 7.9 MG/DL
CALCIUM SERPL-MCNC: 8.2 MG/DL — LOW (ref 8.4–10.5)
CHLORIDE SERPL-SCNC: 105 MMOL/L
CHLORIDE SERPL-SCNC: 105 MMOL/L — SIGNIFICANT CHANGE UP (ref 96–108)
CO2 SERPL-SCNC: 24 MMOL/L
CO2 SERPL-SCNC: 24 MMOL/L — SIGNIFICANT CHANGE UP (ref 22–31)
CREAT SERPL-MCNC: 1.44 MG/DL — HIGH (ref 0.5–1.3)
CREAT SERPL-MCNC: 1.58 MG/DL
GLUCOSE SERPL-MCNC: 102 MG/DL — HIGH (ref 70–99)
GLUCOSE SERPL-MCNC: 97 MG/DL
HCT VFR BLD CALC: 22 % — LOW (ref 39–50)
HGB BLD-MCNC: 7.1 G/DL — LOW (ref 13–17)
INR BLD: 2.01 RATIO — HIGH (ref 0.88–1.16)
MAGNESIUM SERPL-MCNC: 1.1 MG/DL — LOW (ref 1.6–2.6)
MAGNESIUM SERPL-MCNC: 1.4 MG/DL — LOW (ref 1.6–2.6)
MAGNESIUM SERPL-MCNC: 1.5 MG/DL
MCHC RBC-ENTMCNC: 31.7 PG — SIGNIFICANT CHANGE UP (ref 27–34)
MCHC RBC-ENTMCNC: 32.3 GM/DL — SIGNIFICANT CHANGE UP (ref 32–36)
MCV RBC AUTO: 98.2 FL — SIGNIFICANT CHANGE UP (ref 80–100)
NRBC # BLD: 0 /100 WBCS — SIGNIFICANT CHANGE UP (ref 0–0)
PLATELET # BLD AUTO: 53 K/UL — LOW (ref 150–400)
POTASSIUM SERPL-MCNC: 4.1 MMOL/L — SIGNIFICANT CHANGE UP (ref 3.5–5.3)
POTASSIUM SERPL-SCNC: 4.1 MMOL/L — SIGNIFICANT CHANGE UP (ref 3.5–5.3)
POTASSIUM SERPL-SCNC: 4.6 MMOL/L
PROT SERPL-MCNC: 5.3 G/DL
PROTHROM AB SERPL-ACNC: 23.3 SEC — HIGH (ref 10.6–13.6)
RBC # BLD: 2.24 M/UL — LOW (ref 4.2–5.8)
RBC # FLD: 21.2 % — HIGH (ref 10.3–14.5)
SODIUM SERPL-SCNC: 141 MMOL/L — SIGNIFICANT CHANGE UP (ref 135–145)
SODIUM SERPL-SCNC: 142 MMOL/L
WBC # BLD: 1.44 K/UL — LOW (ref 3.8–10.5)
WBC # FLD AUTO: 1.44 K/UL — LOW (ref 3.8–10.5)

## 2021-12-14 PROCEDURE — 83735 ASSAY OF MAGNESIUM: CPT

## 2021-12-14 PROCEDURE — 36415 COLL VENOUS BLD VENIPUNCTURE: CPT

## 2021-12-14 PROCEDURE — 92960 CARDIOVERSION ELECTRIC EXT: CPT

## 2021-12-14 PROCEDURE — 85027 COMPLETE CBC AUTOMATED: CPT

## 2021-12-14 PROCEDURE — 93005 ELECTROCARDIOGRAM TRACING: CPT

## 2021-12-14 PROCEDURE — 80048 BASIC METABOLIC PNL TOTAL CA: CPT

## 2021-12-14 PROCEDURE — 93010 ELECTROCARDIOGRAM REPORT: CPT | Mod: 76

## 2021-12-14 PROCEDURE — 85730 THROMBOPLASTIN TIME PARTIAL: CPT

## 2021-12-14 PROCEDURE — 85610 PROTHROMBIN TIME: CPT

## 2021-12-14 RX ORDER — DIGOXIN 250 MCG
1 TABLET ORAL
Qty: 0 | Refills: 0 | DISCHARGE

## 2021-12-14 RX ORDER — BUPROPION HYDROCHLORIDE 150 MG/1
75 TABLET, EXTENDED RELEASE ORAL
Qty: 0 | Refills: 0 | DISCHARGE

## 2021-12-14 RX ORDER — RIVAROXABAN 15 MG-20MG
1 KIT ORAL
Qty: 0 | Refills: 0 | DISCHARGE

## 2021-12-14 RX ORDER — MAGNESIUM OXIDE 400 MG ORAL TABLET 241.3 MG
1 TABLET ORAL
Qty: 0 | Refills: 0 | DISCHARGE

## 2021-12-14 RX ORDER — BUPROPION HYDROCHLORIDE 150 MG/1
2 TABLET, EXTENDED RELEASE ORAL
Qty: 0 | Refills: 0 | DISCHARGE

## 2021-12-14 RX ORDER — FUROSEMIDE 40 MG
1.5 TABLET ORAL
Qty: 0 | Refills: 0 | DISCHARGE

## 2021-12-14 RX ORDER — POTASSIUM CHLORIDE 20 MEQ
1 PACKET (EA) ORAL
Qty: 0 | Refills: 0 | DISCHARGE

## 2021-12-14 RX ORDER — MAGNESIUM SULFATE 500 MG/ML
1 VIAL (ML) INJECTION ONCE
Refills: 0 | Status: COMPLETED | OUTPATIENT
Start: 2021-12-14 | End: 2021-12-14

## 2021-12-14 RX ORDER — METOPROLOL TARTRATE 50 MG
1 TABLET ORAL
Qty: 0 | Refills: 0 | DISCHARGE

## 2021-12-14 RX ORDER — AMLODIPINE BESYLATE 2.5 MG/1
1 TABLET ORAL
Qty: 0 | Refills: 0 | DISCHARGE

## 2021-12-14 RX ORDER — ALPRAZOLAM 0.25 MG
2 TABLET ORAL
Qty: 0 | Refills: 0 | DISCHARGE

## 2021-12-14 RX ADMIN — Medication 100 GRAM(S): at 09:50

## 2021-12-14 NOTE — ASU PATIENT PROFILE, ADULT - FALL HARM RISK - UNIVERSAL INTERVENTIONS
Bed in lowest position, wheels locked, appropriate side rails in place/Call bell, personal items and telephone in reach/Instruct patient to call for assistance before getting out of bed or chair/Non-slip footwear when patient is out of bed/[place] to call system/Physically safe environment - no spills, clutter or unnecessary equipment/Purposeful Proactive Rounding/Room/bathroom lighting operational, light cord in reach

## 2021-12-20 ENCOUNTER — APPOINTMENT (OUTPATIENT)
Dept: CARDIOLOGY | Facility: CLINIC | Age: 61
End: 2021-12-20
Payer: COMMERCIAL

## 2021-12-20 ENCOUNTER — NON-APPOINTMENT (OUTPATIENT)
Age: 61
End: 2021-12-20

## 2021-12-20 VITALS
HEART RATE: 69 BPM | HEIGHT: 73 IN | DIASTOLIC BLOOD PRESSURE: 70 MMHG | SYSTOLIC BLOOD PRESSURE: 138 MMHG | OXYGEN SATURATION: 97 % | BODY MASS INDEX: 40.95 KG/M2 | WEIGHT: 309 LBS

## 2021-12-20 PROCEDURE — 93000 ELECTROCARDIOGRAM COMPLETE: CPT

## 2021-12-20 PROCEDURE — 99215 OFFICE O/P EST HI 40 MIN: CPT

## 2021-12-20 NOTE — HISTORY OF PRESENT ILLNESS
[FreeTextEntry1] : Cardioversion with return to SR was performed by Dr. Guadalupe last week.  As he returns today, he feels about the same.  He still reports BLACKWELL and persistent LE edema.\par \par He describes no palps & no exertional chest discomfort.  He reports no orthopnea or PND.  \par \par He takes the Xarelto conscientiously; he reports mild epistaxis.

## 2021-12-20 NOTE — PHYSICAL EXAM
[Normal Appearance] : normal appearance [General Appearance - In No Acute Distress] : no acute distress [Normal Conjunctiva] : the conjunctiva exhibited no abnormalities [Eyelids - No Xanthelasma] : the eyelids demonstrated no xanthelasmas [Normal Jugular Venous A Waves Present] : normal jugular venous A waves present [Normal Jugular Venous V Waves Present] : normal jugular venous V waves present [No Jugular Venous Raymundo A Waves] : no jugular venous raymundo A waves [Respiration, Rhythm And Depth] : normal respiratory rhythm and effort [Auscultation Breath Sounds / Voice Sounds] : lungs were clear to auscultation bilaterally [Heart Rate And Rhythm] : heart rate and rhythm were normal [Murmurs] : no murmurs present [Bowel Sounds] : normal bowel sounds [Abnormal Walk] : normal gait [Nail Clubbing] : no clubbing of the fingernails [Cyanosis, Localized] : no localized cyanosis [Skin Color & Pigmentation] : normal skin color and pigmentation [] : no rash [No Venous Stasis] : no venous stasis [Oriented To Time, Place, And Person] : oriented to person, place, and time [Affect] : the affect was normal [Impaired Insight] : insight and judgment were intact [Mood] : the mood was normal [FreeTextEntry1] : No edema. Pulses are 2+ in the upper and lower extremities.

## 2021-12-20 NOTE — DISCUSSION/SUMMARY
[FreeTextEntry1] : PAF, s/p successful cardioversion to SR\par - to continue metoprolol and digoxin.\par - he will have blood drawn tomorrow with chemo; I asked him to e-mail me the results.\par \par A/C \par - continue given hx. of PAF and risk of recurrent atrial fibrillation.\par - advised humidifier for bedroom run 24 hours per day\par \par LE edema\par - will increase furosemide to 80 mg. tab each day.  \par - I asked him to review fluid management with Dr. Mora/chemo team.  He received I liter of NS with treatment plus solusets as needed (usual Mg+).  He is encourage to drink large amounts of fluid on the other days of the week.  Given the degree of LE edema, I don't know if he needs that much fluid.  Given the need to increase the dose of Lasix, I am inclined to have hit cut back on the po fluid intake, but I asked him to check with Dr. Mora and the chemo team.\par \par Hypertension \par - blood pressure in reasonable range today; will likely improve on increased diuretic dose.  \par \par 40 minutes spent on today's office visit.\par \par He will call me with an update next week and will return for an O.V. on Jan. 31, 2022.

## 2021-12-20 NOTE — REASON FOR VISIT
[FreeTextEntry1] : \deniz Omari Wallace returns for a followup visit regarding AF s/p cardioversion last week and HTN.

## 2021-12-20 NOTE — ASSESSMENT
[FreeTextEntry1] : PAF, s/p successful cardioversion to SR.\par \par Hypertension\par \par Gastroesophageal reflux disease.\par \par Obesity, s/p bariatric surgery (gastric sleeve, repair of paraesophageal hernia 10/24/17).\par \par Biliary tract cancer

## 2021-12-27 ENCOUNTER — NON-APPOINTMENT (OUTPATIENT)
Age: 61
End: 2021-12-27

## 2022-01-31 ENCOUNTER — APPOINTMENT (OUTPATIENT)
Dept: CARDIOLOGY | Facility: CLINIC | Age: 62
End: 2022-01-31
Payer: COMMERCIAL

## 2022-01-31 ENCOUNTER — NON-APPOINTMENT (OUTPATIENT)
Age: 62
End: 2022-01-31

## 2022-01-31 VITALS
OXYGEN SATURATION: 99 % | SYSTOLIC BLOOD PRESSURE: 132 MMHG | DIASTOLIC BLOOD PRESSURE: 80 MMHG | HEIGHT: 73 IN | WEIGHT: 261 LBS | BODY MASS INDEX: 34.59 KG/M2 | HEART RATE: 62 BPM | RESPIRATION RATE: 15 BRPM

## 2022-01-31 PROCEDURE — 99214 OFFICE O/P EST MOD 30 MIN: CPT

## 2022-01-31 PROCEDURE — 36415 COLL VENOUS BLD VENIPUNCTURE: CPT

## 2022-01-31 PROCEDURE — 93000 ELECTROCARDIOGRAM COMPLETE: CPT

## 2022-01-31 RX ORDER — DIGOXIN 250 UG/1
250 TABLET ORAL
Qty: 90 | Refills: 1 | Status: DISCONTINUED | COMMUNITY
Start: 2021-07-26 | End: 2022-01-31

## 2022-01-31 NOTE — HISTORY OF PRESENT ILLNESS
[FreeTextEntry1] : Cardioversion with return to SR was performed by Dr. Guadalupe in December.  As he returns today, he reports no palps.  BLACKWELL has resolved and LE edema is improved although not completely gone.  He describes no exertional CP and no BLACKWELL.  There have been no episode of orthopnea or PND.  \par \par He takes the Xarelto conscientiously; he reports no recurrence of epistaxis.\par \par He continues on chemoRx for a biliary tumor and will get his next treatment tomorrow.  He tells me that the plan is to continue 6 cycles and then repeat a CT scan.

## 2022-01-31 NOTE — DISCUSSION/SUMMARY
[FreeTextEntry1] : PAF, s/p successful cardioversion to SR; remains in NSR today.\par - to continue metoprolol.\par - will d/c digoxin.\par - will check CMP with magnesium.\par \par A/C \par - continue given hx. of PAF and risk of recurrent atrial fibrillation.\par - advised humidifier for bedroom run 24 hours per day given prior episodes of epistaxis.\par \par LE edema\par - continue furosemide to 40 mg. 2 tabs each day.  \par - Reminded to avoid salt.  He does get IV fluid with chemo and is encouaged to keep PO intake up during week by the chemo team; this account in part for some of the residual edema.\par \par Hypertension \par - blood pressure in reasonable range today; continue current meds.   \par \par 30 minutes spent on today's office visit.\par \par He will return in 2 months.

## 2022-01-31 NOTE — REASON FOR VISIT
[FreeTextEntry1] : \deniz Omari Wallace returns for a followup visit regarding AF s/p cardioversion, A/C and HTN.

## 2022-02-01 LAB
ALBUMIN SERPL ELPH-MCNC: 3.8 G/DL
ALP BLD-CCNC: 80 U/L
ALT SERPL-CCNC: 10 U/L
ANION GAP SERPL CALC-SCNC: 12 MMOL/L
AST SERPL-CCNC: 18 U/L
BILIRUB SERPL-MCNC: 0.3 MG/DL
BUN SERPL-MCNC: 26 MG/DL
CALCIUM SERPL-MCNC: 9 MG/DL
CHLORIDE SERPL-SCNC: 104 MMOL/L
CO2 SERPL-SCNC: 25 MMOL/L
CREAT SERPL-MCNC: 1.43 MG/DL
MAGNESIUM SERPL-MCNC: 1.4 MG/DL
POTASSIUM SERPL-SCNC: 4.6 MMOL/L
PROT SERPL-MCNC: 6.2 G/DL
SODIUM SERPL-SCNC: 142 MMOL/L

## 2022-03-30 ENCOUNTER — APPOINTMENT (OUTPATIENT)
Dept: CARDIOLOGY | Facility: CLINIC | Age: 62
End: 2022-03-30
Payer: COMMERCIAL

## 2022-03-30 ENCOUNTER — NON-APPOINTMENT (OUTPATIENT)
Age: 62
End: 2022-03-30

## 2022-03-30 VITALS
DIASTOLIC BLOOD PRESSURE: 80 MMHG | HEART RATE: 64 BPM | SYSTOLIC BLOOD PRESSURE: 146 MMHG | HEIGHT: 73 IN | WEIGHT: 292 LBS | OXYGEN SATURATION: 97 % | BODY MASS INDEX: 38.7 KG/M2

## 2022-03-30 PROCEDURE — 93000 ELECTROCARDIOGRAM COMPLETE: CPT

## 2022-03-30 PROCEDURE — 99215 OFFICE O/P EST HI 40 MIN: CPT

## 2022-03-30 NOTE — PHYSICAL EXAM
[Normal Appearance] : normal appearance [General Appearance - In No Acute Distress] : no acute distress [Normal Conjunctiva] : the conjunctiva exhibited no abnormalities [Eyelids - No Xanthelasma] : the eyelids demonstrated no xanthelasmas [Normal Jugular Venous A Waves Present] : normal jugular venous A waves present [Normal Jugular Venous V Waves Present] : normal jugular venous V waves present [No Jugular Venous Raymundo A Waves] : no jugular venous raymundo A waves [Respiration, Rhythm And Depth] : normal respiratory rhythm and effort [Auscultation Breath Sounds / Voice Sounds] : lungs were clear to auscultation bilaterally [Heart Rate And Rhythm] : heart rate and rhythm were normal [Murmurs] : no murmurs present [Bowel Sounds] : normal bowel sounds [Abnormal Walk] : normal gait [Nail Clubbing] : no clubbing of the fingernails [Cyanosis, Localized] : no localized cyanosis [Skin Color & Pigmentation] : normal skin color and pigmentation [] : no rash [Oriented To Time, Place, And Person] : oriented to person, place, and time [Impaired Insight] : insight and judgment were intact [Affect] : the affect was normal [Mood] : the mood was normal [FreeTextEntry1] : Mild skin changes over the pretibial surfaces consistent with venous insufficiency.  Small abrasion on the shin.

## 2022-03-30 NOTE — HISTORY OF PRESENT ILLNESS
[FreeTextEntry1] : As he returns today, he is doing better overall.  He has finished his initial course of chemotherapy and is on a maintenance protocol at this time.  Fortunately, this is a more tolerable regimen and he feels better than he has for a while.  He will have a follow-up CT scan in June.\par \par From a cardiac standpoint, he describes no palpitations.  He remains active without any significant exertional dyspnea.  While the lower extremity edema is improved, it is not completely gone.  In part, this is because he cut the dose of furosemide from 80 mg down to 40 mg each day; he found he was urinating too frequently on the higher dose.  He describes no episodes of exertional chest discomfort. There have been no episode of orthopnea or PND.  \par \par He continues on Xarelto describes no bleeding issues.

## 2022-03-30 NOTE — DISCUSSION/SUMMARY
[FreeTextEntry1] : PAF, Remains in sinus rhythm following his cardioversion last December by Dr. Guadalupe.  \par – he will continue on metoprolol at the current dose.\par - He recently had a comprehensive profile including magnesium level checked by his the oncologist and he will email me a copy of the results.  \par \par A/C \par - He asks if he could discontinue this, but I advised against this, given hx. of PAF and risk of recurrent atrial fibrillation.\par - He will email me a copy of the recent CBC, also checked by the oncologist, given his ongoing treatment with anticoagulation..\par \par LE edema\par - I recommended that he increase the furosemide; I am hoping he can tolerate at least 60 mg each day.  \par - I again reminded him to avoid salt and to keep fluid intake down as guided by thirst.  \par - I reminded him to keep his elevate legs elevated when possible and to consider use of compression stockings.  I also asked him to make an appointment to see Dr. Helms of vascular cardiology for guidance with further options to treat his chronic lower extremity edema, particularly in light of the skin abrasion noted today.\par \par Hypertension \par - blood pressure in reasonable range today; should improve with the higher diuretic dose.   \par \par 41 minutes spent on today's office visit.\par \par He will return in 4 months.

## 2022-03-30 NOTE — ASSESSMENT
[FreeTextEntry1] : PAF, s/p successful cardioversion to SR.\par \par Hypertension\par \par Gastroesophageal reflux disease.\par \par Obesity, s/p bariatric surgery (gastric sleeve, repair of paraesophageal hernia 10/24/17).\par \par Biliary tract cancer\par \par Lower extremity venous insufficiency with associated trophic skin changes and edema.

## 2022-05-02 ENCOUNTER — APPOINTMENT (OUTPATIENT)
Dept: CARDIOLOGY | Facility: CLINIC | Age: 62
End: 2022-05-02
Payer: COMMERCIAL

## 2022-05-02 VITALS
HEIGHT: 73 IN | OXYGEN SATURATION: 99 % | BODY MASS INDEX: 39.76 KG/M2 | SYSTOLIC BLOOD PRESSURE: 167 MMHG | DIASTOLIC BLOOD PRESSURE: 78 MMHG | WEIGHT: 300 LBS | HEART RATE: 62 BPM

## 2022-05-02 PROCEDURE — 99213 OFFICE O/P EST LOW 20 MIN: CPT

## 2022-05-02 NOTE — PHYSICAL EXAM
[General Appearance - Well Developed] : well developed [Normal Appearance] : normal appearance [Well Groomed] : well groomed [General Appearance - Well Nourished] : well nourished [No Deformities] : no deformities [General Appearance - In No Acute Distress] : no acute distress [Normal Conjunctiva] : the conjunctiva exhibited no abnormalities [Eyelids - No Xanthelasma] : the eyelids demonstrated no xanthelasmas [Normal Oral Mucosa] : normal oral mucosa [No Oral Pallor] : no oral pallor [No Oral Cyanosis] : no oral cyanosis [Normal Jugular Venous A Waves Present] : normal jugular venous A waves present [Normal Jugular Venous V Waves Present] : normal jugular venous V waves present [No Jugular Venous Raymundo A Waves] : no jugular venous raymundo A waves [Heart Rate And Rhythm] : heart rate and rhythm were normal [Heart Sounds] : normal S1 and S2 [Murmurs] : no murmurs present [Respiration, Rhythm And Depth] : normal respiratory rhythm and effort [Exaggerated Use Of Accessory Muscles For Inspiration] : no accessory muscle use [Auscultation Breath Sounds / Voice Sounds] : lungs were clear to auscultation bilaterally [Abdomen Soft] : soft [Abdomen Tenderness] : non-tender [] : no hepato-splenomegaly [Abdomen Mass (___ Cm)] : no abdominal mass palpated [FreeTextEntry1] : bilateral hyperpigmetnation.  lipodermatosclerosis.  + stemmers sign.  Small, superifcal wound that is healing on the right anterior shin  [Oriented To Time, Place, And Person] : oriented to person, place, and time [Affect] : the affect was normal [Mood] : the mood was normal [No Anxiety] : not feeling anxious

## 2022-05-02 NOTE — ASSESSMENT
[FreeTextEntry1] : \par Assessment:\par 1  HTN\par 2.  LE edema - \par improved with diuretics\par improves with leg elevation\par  remains symptomatic despite this\par phlebolymphedema\par 3.  Malignancy - biliary\par 4.  Obesity\par 5.  R foot drop \par \par PLan:\par 1.  Continue diuretic\par 2.  He is on A/C doubt DVT - no role for venous dulpex\par 3.  No sig varicose veins on exam\par 4.  Consider d/c amlodipine, and substitute for alternative agent.\par 5.  Calf pump exercises, leg elevation, ambulation \par 6.  Patient has phlebolymphedema and has tried conservative therapies such as leg elevation for over one month, but still remains symptomatic.  There is hyperpigmentation and lipodermatosclerosis on exam.  Will arrange for a pneumatic pump.\par 7.  Consider repeat echo to assess EF\par 8.  He will see Dr. Alegre later this week to discuss HTN management.

## 2022-05-02 NOTE — REASON FOR VISIT
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  History of Afib, HTN, malignancy.  Here for lower extremity edema.  Was on lasix 80mg down to 40mg daily.  On xarelto for afib.  History of cardioversion with Dr. Guadalupe.  Biliary tumor, chemotherapy.  \par \par Labs March 2022:  \par Hemoglobin 9.8\par Platelets 245\par Creatine 1.3\par \par Echo 2017:  Mild MR, mild aortic calcification, mild LVH, normal RV function\par \par Venous duplex (gem 2017) no DVT\par \par Cancer is gone, he is now on lower dose maintence via port, 3x per month at [facility]. \par No coronary stent. \par \par Echocardiogram:  Last in 2017.  \par \par Medications:\par Lasix 80mg daily (does not like it )\par Xarelto 20mg daily \par Metoprolol 200mg daily \par Potassium 10\par Buproprion \par Ursodiol 300\par Amlodipine 5mg daily  (added by Dr. Alegre)\par \par \par Reports elevated BP . \par \par He does not wear compression garments\par The edema gets worse as the day progresses\par Diuretic makes the edema better. \par Leg elevation makes it better. \par

## 2022-05-06 ENCOUNTER — NON-APPOINTMENT (OUTPATIENT)
Age: 62
End: 2022-05-06

## 2022-05-06 ENCOUNTER — APPOINTMENT (OUTPATIENT)
Dept: CARDIOLOGY | Facility: CLINIC | Age: 62
End: 2022-05-06
Payer: COMMERCIAL

## 2022-05-06 VITALS
HEART RATE: 69 BPM | DIASTOLIC BLOOD PRESSURE: 70 MMHG | BODY MASS INDEX: 39.76 KG/M2 | SYSTOLIC BLOOD PRESSURE: 158 MMHG | HEIGHT: 73 IN | OXYGEN SATURATION: 100 % | WEIGHT: 300 LBS

## 2022-05-06 PROCEDURE — 99215 OFFICE O/P EST HI 40 MIN: CPT

## 2022-05-06 PROCEDURE — 93000 ELECTROCARDIOGRAM COMPLETE: CPT

## 2022-05-06 RX ORDER — AMLODIPINE BESYLATE 5 MG/1
5 TABLET ORAL DAILY
Qty: 90 | Refills: 1 | Status: DISCONTINUED | COMMUNITY
Start: 2017-10-16 | End: 2022-05-06

## 2022-05-06 RX ORDER — POTASSIUM CHLORIDE 750 MG/1
10 TABLET, EXTENDED RELEASE ORAL
Qty: 90 | Refills: 0 | Status: DISCONTINUED | COMMUNITY
Start: 2022-01-21

## 2022-05-06 RX ORDER — PROCHLORPERAZINE MALEATE 10 MG/1
10 TABLET ORAL
Qty: 40 | Refills: 0 | Status: ACTIVE | COMMUNITY
Start: 2021-11-10

## 2022-05-06 RX ORDER — URSODIOL 300 MG/1
300 CAPSULE ORAL
Qty: 60 | Refills: 0 | Status: ACTIVE | COMMUNITY
Start: 2022-02-25

## 2022-05-06 RX ORDER — METOCLOPRAMIDE 10 MG/1
10 TABLET ORAL
Qty: 60 | Refills: 0 | Status: ACTIVE | COMMUNITY
Start: 2021-12-21

## 2022-05-06 NOTE — DISCUSSION/SUMMARY
[FreeTextEntry1] : LE edema \par - continue furosemide 60 mg each day.  \par - avoid salt and to keep fluid intake down as guided by thirst.  \par - as requested by Dr. Helms, will d/c Norvasc.  Will switch beta blocker to Coreg 25 mg. bid.\par \par PAF - remains in sinus rhythm following his cardioversion last December by Dr. Guadalupe.  \par – carvedilol as above in lieu of metoprolol.\par - recently K was 3.7; has only been taking one K tab per day.  Advised to increase this to 2 tabs per day.   \par \par A/C - given hx. of PAF and risk of recurrent atrial fibrillation, continue Xarelto.\par \par Hypertension - blood pressure in normal range today; meds as above.   \par \par 40 minutes spent on today's office visit.  \par \par He will return in 2  months; will arrange TTE at that time. "Environment Tobacco Smoke" Pamphlet given

## 2022-05-06 NOTE — HISTORY OF PRESENT ILLNESS
[FreeTextEntry1] : Since I saw him last, he saw Dr. Helms re plebolymphedema. Dr. Helms suggested changing amlodipine to a different agent and planned to arrange for a pneumatic pump.\par \par As he returns today, he remains better overall.  He continue maintenance chemo and will have a CT scan.  \par \par From a cardiac standpoint, he describes no palpitations.  He remains active without any significant exertional dyspnea.  Currently, he takes furosemide 60 mg each day.  He reports no exertional chest discomfort. There have been no episode of orthopnea or PND.  \par \par He continues on Xarelto describes no bleeding issues.

## 2022-05-10 ENCOUNTER — NON-APPOINTMENT (OUTPATIENT)
Age: 62
End: 2022-05-10

## 2022-07-08 ENCOUNTER — NON-APPOINTMENT (OUTPATIENT)
Age: 62
End: 2022-07-08

## 2022-07-08 ENCOUNTER — APPOINTMENT (OUTPATIENT)
Dept: CARDIOLOGY | Facility: CLINIC | Age: 62
End: 2022-07-08

## 2022-07-08 VITALS
BODY MASS INDEX: 40.42 KG/M2 | RESPIRATION RATE: 15 BRPM | WEIGHT: 305 LBS | SYSTOLIC BLOOD PRESSURE: 190 MMHG | HEIGHT: 73 IN | DIASTOLIC BLOOD PRESSURE: 94 MMHG | HEART RATE: 70 BPM

## 2022-07-08 PROCEDURE — 99214 OFFICE O/P EST MOD 30 MIN: CPT

## 2022-07-08 PROCEDURE — 93000 ELECTROCARDIOGRAM COMPLETE: CPT

## 2022-07-10 ENCOUNTER — NON-APPOINTMENT (OUTPATIENT)
Age: 62
End: 2022-07-10

## 2022-07-10 NOTE — HISTORY OF PRESENT ILLNESS
[FreeTextEntry1] : He continues on maintenance chemo.\par \par Due to plebolymphedema, amlodipine was previously stopped.\par \par He reports no palpitations & no significant exertional dyspnea.  He reports no exertional chest discomfort. There have been no episode of orthopnea or PND.  \par \par He continues on Xarelto describes no bleeding issues.  \par \par He called on Wed. concerned re elevated blood pressure readings ol late as well as headaches. When he checks his blood pressure, he gets readings as high as 200/100 mmHg.\par \par With his chemo, he appears that he is given a consider amount of IV fluid each time for concern of preserving renal function.  He tells us that most recent creatinine level was 1.8 mg/dL.\par \par When he call Wed, we advised him to increase Lasix to 80 mg.; he continues with carvedilol 25 mg BID. \par \par

## 2022-07-10 NOTE — DISCUSSION/SUMMARY
[EKG obtained to assist in diagnosis and management of assessed problem(s)] : EKG obtained to assist in diagnosis and management of assessed problem(s) [FreeTextEntry1] : Hypertension - elevated due to d/c of amlodipine previously and likely hydration with chemoRx.\par - will add clonidine 0.1 mg BID for added antihypertensive effect.    \par - asked him to have M.D. at  [facility] to call rel fluid management. \par - continue Lasix at 80 mg. daily\par \par LE edema \par - continue furosemide 80 mg each day.  \par - avoid salt and to keep fluid intake down as guided by thirst.  \par \par PAF - remains in sinus rhythm following his cardioversion last December by Dr. Guadalupe.  \par – carvedilol as above in lieu of metoprolol.\par \par A/C - given hx. of PAF and risk of recurrent atrial fibrillation, continue Xarelto.\par \par 31 minutes spent on today's office visit.  \par \par Follow up as scheduled later this month with TTE.

## 2022-07-10 NOTE — REASON FOR VISIT
[FreeTextEntry1] : \par Omari Wallace returns for a followup visit regarding HTN with higher readings of late.  Hx. includes AF s/p cardioversion & ongoing A/C.\par

## 2022-07-20 ENCOUNTER — APPOINTMENT (OUTPATIENT)
Dept: NEPHROLOGY | Facility: CLINIC | Age: 62
End: 2022-07-20

## 2022-07-20 ENCOUNTER — TRANSCRIPTION ENCOUNTER (OUTPATIENT)
Age: 62
End: 2022-07-20

## 2022-07-20 VITALS
OXYGEN SATURATION: 97 % | BODY MASS INDEX: 40.42 KG/M2 | DIASTOLIC BLOOD PRESSURE: 76 MMHG | HEIGHT: 73 IN | TEMPERATURE: 97.9 F | WEIGHT: 305 LBS | SYSTOLIC BLOOD PRESSURE: 173 MMHG | HEART RATE: 65 BPM

## 2022-07-20 VITALS — SYSTOLIC BLOOD PRESSURE: 143 MMHG | DIASTOLIC BLOOD PRESSURE: 77 MMHG | HEART RATE: 64 BPM

## 2022-07-20 PROCEDURE — 99205 OFFICE O/P NEW HI 60 MIN: CPT

## 2022-07-21 LAB
ALBUMIN SERPL ELPH-MCNC: 4.1 G/DL
ANION GAP SERPL CALC-SCNC: 14 MMOL/L
APPEARANCE: CLEAR
BACTERIA: NEGATIVE
BASOPHILS # BLD AUTO: 0.04 K/UL
BASOPHILS NFR BLD AUTO: 0.7 %
BILIRUBIN URINE: NEGATIVE
BLOOD URINE: NEGATIVE
BUN SERPL-MCNC: 50 MG/DL
CALCIUM SERPL-MCNC: 9 MG/DL
CHLORIDE SERPL-SCNC: 105 MMOL/L
CO2 SERPL-SCNC: 24 MMOL/L
COLOR: NORMAL
CREAT SERPL-MCNC: 2.14 MG/DL
CREAT SPEC-SCNC: 54 MG/DL
CREAT/PROT UR: 0.1 RATIO
CYSTATIN C SERPL-MCNC: 2.35 MG/L
EGFR: 34 ML/MIN/1.73M2
EOSINOPHIL # BLD AUTO: 0.08 K/UL
EOSINOPHIL NFR BLD AUTO: 1.4 %
FERRITIN SERPL-MCNC: 216 NG/ML
GFR/BSA.PRED SERPLBLD CYS-BASED-ARV: 25 ML/MIN/1.73M2
GLUCOSE QUALITATIVE U: NEGATIVE
GLUCOSE SERPL-MCNC: 98 MG/DL
HAPTOGLOB SERPL-MCNC: 44 MG/DL
HCT VFR BLD CALC: 35.8 %
HGB BLD-MCNC: 11.2 G/DL
HYALINE CASTS: 0 /LPF
IMM GRANULOCYTES NFR BLD AUTO: 0.2 %
IRON SATN MFR SERPL: 11 %
IRON SERPL-MCNC: 40 UG/DL
KETONES URINE: NEGATIVE
LDH SERPL-CCNC: 277 U/L
LEUKOCYTE ESTERASE URINE: NEGATIVE
LYMPHOCYTES # BLD AUTO: 1.26 K/UL
LYMPHOCYTES NFR BLD AUTO: 22.5 %
MAN DIFF?: NORMAL
MCHC RBC-ENTMCNC: 31.3 GM/DL
MCHC RBC-ENTMCNC: 32 PG
MCV RBC AUTO: 102.3 FL
MICROSCOPIC-UA: NORMAL
MONOCYTES # BLD AUTO: 0.73 K/UL
MONOCYTES NFR BLD AUTO: 13.1 %
NEUTROPHILS # BLD AUTO: 3.47 K/UL
NEUTROPHILS NFR BLD AUTO: 62.1 %
NITRITE URINE: NEGATIVE
PH URINE: 5.5
PHOSPHATE SERPL-MCNC: 4.7 MG/DL
PLATELET # BLD AUTO: 252 K/UL
POTASSIUM SERPL-SCNC: 4.2 MMOL/L
PROT UR-MCNC: 4 MG/DL
PROTEIN URINE: NEGATIVE
RBC # BLD: 3.5 M/UL
RBC # FLD: 15.2 %
RED BLOOD CELLS URINE: 1 /HPF
SODIUM SERPL-SCNC: 143 MMOL/L
SPECIFIC GRAVITY URINE: 1.01
SQUAMOUS EPITHELIAL CELLS: 0 /HPF
TIBC SERPL-MCNC: 365 UG/DL
UIBC SERPL-MCNC: 325 UG/DL
UROBILINOGEN URINE: NORMAL
VIT B12 SERPL-MCNC: 572 PG/ML
WBC # FLD AUTO: 5.59 K/UL
WHITE BLOOD CELLS URINE: 0 /HPF

## 2022-07-25 ENCOUNTER — APPOINTMENT (OUTPATIENT)
Dept: CARDIOLOGY | Facility: CLINIC | Age: 62
End: 2022-07-25

## 2022-07-26 ENCOUNTER — APPOINTMENT (OUTPATIENT)
Dept: ULTRASOUND IMAGING | Facility: CLINIC | Age: 62
End: 2022-07-26

## 2022-07-26 PROCEDURE — 76775 US EXAM ABDO BACK WALL LIM: CPT

## 2022-07-27 ENCOUNTER — APPOINTMENT (OUTPATIENT)
Dept: CARDIOLOGY | Facility: CLINIC | Age: 62
End: 2022-07-27

## 2022-07-27 VITALS
OXYGEN SATURATION: 99 % | DIASTOLIC BLOOD PRESSURE: 80 MMHG | HEIGHT: 73 IN | SYSTOLIC BLOOD PRESSURE: 158 MMHG | RESPIRATION RATE: 15 BRPM | WEIGHT: 305 LBS | HEART RATE: 57 BPM | BODY MASS INDEX: 40.42 KG/M2

## 2022-07-27 PROCEDURE — 93306 TTE W/DOPPLER COMPLETE: CPT

## 2022-07-27 PROCEDURE — 93000 ELECTROCARDIOGRAM COMPLETE: CPT

## 2022-07-27 PROCEDURE — 99215 OFFICE O/P EST HI 40 MIN: CPT | Mod: 25

## 2022-07-27 NOTE — DISCUSSION/SUMMARY
[EKG obtained to assist in diagnosis and management of assessed problem(s)] : EKG obtained to assist in diagnosis and management of assessed problem(s) [FreeTextEntry1] : LE edema \par - continue furosemide 40 mg each day.  \par - avoid salt and to keep fluid intake down as guided by thirst.  \par -We will speak to Dr. Mora regarding the patient's chemotherapy treatments and how much fluid is administered with each treatment.  \par \par PAF - remains in sinus rhythm following his cardioversion last December by Dr. Guadalupe.  \par – Continue carvedilol in lieu of metoprolol, for its beta-blocking effect to suppress AF and the combined beta-blocker and alpha-blocker effect for hypertension.\par \par A/C - given hx. of PAF and risk of recurrent atrial fibrillation, continue Xarelto.\par \par Hypertension \par -systolic blood pressure is higher than ideal today.  Given his nocturnal headaches, I asked him to increase clonidine to 0.1 mg 3 times daily.     \par -Echocardiogram shows increased LVH and mild diastolic dysfunction compared to study done 5 years ago.\par \par 40 minutes spent on today's office visit.  \par \par He will return in 2  months.

## 2022-07-27 NOTE — HISTORY OF PRESENT ILLNESS
[FreeTextEntry1] : Since I saw him last, he has been using the pneumatic pump as advised by Dr. Helms, which is helping to control his peripheral edema.\par \par He recently saw Dr. Jerome Yanes, because of increasing azotemia.  The dose of furosemide was reduced to 40 mg daily.  Fortunately, the lower extremity edema does not seem to have increased substantially.  He has noticed higher blood pressure readings at home.  I am not sure how accurate his home blood pressure cuff is; he is using a wrist cuff.  This morning he got a reading of 200/100, and is concerned because he is having nocturnal headaches.\par \par From a cardiac standpoint, there has been no recurrence of palpitations.  He continues his activities without any significant exertional dyspnea.  He reports no exertional chest discomfort. There have been no episode of orthopnea or PND.  \par \par He continues on Xarelto & reports no bleeding issues.

## 2022-07-27 NOTE — ASSESSMENT
[FreeTextEntry1] : PAF, s/p successful cardioversion to SR.\par \par Hypertension\par \par Gastroesophageal reflux disease.\par \par Obesity, s/p bariatric surgery (gastric sleeve, repair of paraesophageal hernia 10/24/17).\par \par Biliary tract cancer\par \par Lower extremity venous insufficiency with associated trophic skin changes and edema.\par \par Azotemia

## 2022-07-28 ENCOUNTER — NON-APPOINTMENT (OUTPATIENT)
Age: 62
End: 2022-07-28

## 2022-07-31 NOTE — H&P PST ADULT - NS PRO REFERRAL CMGT
Report received from [facility] [place] in ED. Patient arrived to room 4324 via stretcher. Patient oriented to room, use of call light. Patient alert, oriented to person, place, and time. Patient has 2 bilateral #20 Ivs in the [facility]. Patient placed on telemetry #[id number], verified at bedside, in [place] [facility] Assessment performed Vitals completed. None

## 2022-08-05 ENCOUNTER — NON-APPOINTMENT (OUTPATIENT)
Age: 62
End: 2022-08-05

## 2022-08-11 ENCOUNTER — RX RENEWAL (OUTPATIENT)
Age: 62
End: 2022-08-11

## 2022-08-30 ENCOUNTER — NON-APPOINTMENT (OUTPATIENT)
Age: 62
End: 2022-08-30

## 2022-09-20 ENCOUNTER — NON-APPOINTMENT (OUTPATIENT)
Age: 62
End: 2022-09-20

## 2022-09-20 ENCOUNTER — LABORATORY RESULT (OUTPATIENT)
Age: 62
End: 2022-09-20

## 2022-09-20 ENCOUNTER — APPOINTMENT (OUTPATIENT)
Dept: CARDIOLOGY | Facility: CLINIC | Age: 62
End: 2022-09-20

## 2022-09-20 VITALS
WEIGHT: 305 LBS | HEART RATE: 75 BPM | SYSTOLIC BLOOD PRESSURE: 192 MMHG | BODY MASS INDEX: 40.24 KG/M2 | DIASTOLIC BLOOD PRESSURE: 96 MMHG | OXYGEN SATURATION: 97 %

## 2022-09-20 DIAGNOSIS — R00.2 PALPITATIONS: ICD-10-CM

## 2022-09-20 PROCEDURE — 99215 OFFICE O/P EST HI 40 MIN: CPT | Mod: 25

## 2022-09-20 PROCEDURE — 93000 ELECTROCARDIOGRAM COMPLETE: CPT

## 2022-09-21 ENCOUNTER — APPOINTMENT (OUTPATIENT)
Dept: CARDIOLOGY | Facility: CLINIC | Age: 62
End: 2022-09-21

## 2022-09-22 LAB
APPEARANCE: ABNORMAL
BILIRUBIN URINE: NEGATIVE
BLOOD URINE: ABNORMAL
COLOR: YELLOW
GLUCOSE QUALITATIVE U: NEGATIVE
KETONES URINE: NEGATIVE
LEUKOCYTE ESTERASE URINE: ABNORMAL
NITRITE URINE: POSITIVE
PH URINE: 6
PROTEIN URINE: ABNORMAL
SPECIFIC GRAVITY URINE: 1.02
UROBILINOGEN URINE: NORMAL

## 2022-09-22 NOTE — DISCUSSION/SUMMARY
[EKG obtained to assist in diagnosis and management of assessed problem(s)] : EKG obtained to assist in diagnosis and management of assessed problem(s) [FreeTextEntry1] : Hypertension \par -systolic blood pressure is higher than ideal today.  Given his nocturnal headaches, I asked him to increase clonidine to 0.2 mg twice daily.    \par \par LE edema \par - increase furosemide to 60 mg each day.  \par - avoid salt and to keep fluid intake down as guided by thirst.  \par \par PAF \par - remains in sinus rhythm following his cardioversion last December by Dr. Guadalupe.  \par – Continue carvedilol for its beta-blocking effect to suppress AF and the combined beta-blocker and alpha-blocker effect for hypertension.\par \par A/C - given hx. of PAF, continue Xarelto.\par \par 40 minutes spent on today's office visit.  \par \par He will return in 3 weeks.

## 2022-09-22 NOTE — HISTORY OF PRESENT ILLNESS
[FreeTextEntry1] : Omari returns today for evaluation of worsening exertional dyspnea, generalized fatigue and elevated blood pressure readings at home. \par \par Earlier this month, he traveled to [place] for vacation and found himself to be more limited than usual in terms of his physical stamina. He reports that he had difficulty walking more than a few feet before needing to stop and recover his breath. His home blood pressure readings have been elevated as well. He has brought his home monitor today which reveals readings as high as 210/110 mm Hg. When his pressure is high, he notes a pounding headache for which he has been taking Tylenol with limited effect. Occasionally he will take a 4th dose of clonidine which drops his pressure quickly.\par \par For his chronic lower extremity edema, he has been taking furosemide 40 mg once daily (as opposed to 80 mg as before) because he gets frustrated with frequent urination. He has not been using his CPAP and has not been using the pneumatic pump for his LEs consistently.\par \par There has been no recurrence of palpitations.  He reports no exertional chest discomfort. There have been no episode of orthopnea or PND.  \par \par He continues on Xarelto & reports no bleeding issues.

## 2022-09-22 NOTE — REASON FOR VISIT
[FreeTextEntry1] : \deniz Omari Wallace returns for a followup visit regarding worsening exertional dyspnea, generalized fatigue and elevated blood pressure readings at home.

## 2022-10-06 ENCOUNTER — APPOINTMENT (OUTPATIENT)
Dept: CARDIOLOGY | Facility: CLINIC | Age: 62
End: 2022-10-06

## 2022-10-06 ENCOUNTER — NON-APPOINTMENT (OUTPATIENT)
Age: 62
End: 2022-10-06

## 2022-10-06 VITALS
BODY MASS INDEX: 40.42 KG/M2 | HEIGHT: 73 IN | OXYGEN SATURATION: 99 % | WEIGHT: 305 LBS | HEART RATE: 59 BPM | DIASTOLIC BLOOD PRESSURE: 85 MMHG | SYSTOLIC BLOOD PRESSURE: 144 MMHG

## 2022-10-06 DIAGNOSIS — Z23 ENCOUNTER FOR IMMUNIZATION: ICD-10-CM

## 2022-10-06 PROCEDURE — 90686 IIV4 VACC NO PRSV 0.5 ML IM: CPT

## 2022-10-06 PROCEDURE — 93000 ELECTROCARDIOGRAM COMPLETE: CPT

## 2022-10-06 PROCEDURE — 99214 OFFICE O/P EST MOD 30 MIN: CPT | Mod: 25

## 2022-10-06 PROCEDURE — 90471 IMMUNIZATION ADMIN: CPT

## 2022-10-09 NOTE — HISTORY OF PRESENT ILLNESS
[FreeTextEntry1] : Omari returns today for follow up re dyspnea, elevated blood pressure readings at home and LE edema. \par \par As he returns, he has been feeling much better. Since increasing clonidine to 0.2 mg twice daily, he has noticed an improvement in his blood pressure and resolution of the headaches.   For his chronic lower extremity edema, he remains on furosemide 60 mg once daily (he has been on 80 mg previously. He has not been using the CPAP machine and has not been using the pneumatic pump for his LEs consistently.\par \par There has been no recurrence of palpitations.  He reports no exertional chest discomfort. There have been no episode of orthopnea or PND.  \par \par He continues on Xarelto & reports no bleeding issues.

## 2022-10-09 NOTE — REASON FOR VISIT
[FreeTextEntry1] : \deniz Gonsalez returns for a followup regarding HTN, H/A and hx of exertional dyspnea and LE edema.

## 2022-10-09 NOTE — ASSESSMENT
[FreeTextEntry1] : Hypertension\par \par PAF, s/p successful cardioversion to SR.\par \par Gastroesophageal reflux disease.\par \par Obesity, s/p bariatric surgery (gastric sleeve, repair of paraesophageal hernia 10/24/17).\par \par Biliary tract cancer\par \par Lower extremity venous insufficiency with associated trophic skin changes and edema.\par \par Azotemia

## 2022-10-09 NOTE — DISCUSSION/SUMMARY
[EKG obtained to assist in diagnosis and management of assessed problem(s)] : EKG obtained to assist in diagnosis and management of assessed problem(s) [FreeTextEntry1] : Hypertension - systolic blood pressure are improved on clonidine to 0.2 mg twice daily.  Would like to see further improvement; advised increasing Lasix back to 80 mg. qd.  \par \par LE edema - as above, increase furosemide back to 80 mg each day.  Recent labs reviewed reveal a stable creatinine level at 1.5 mg/dL.  Avoid salt and to keep fluid intake down as guided by thirst.  \par \par PAF \par - remains in sinus rhythm s/p cardioversion last December by Dr. Guadalupe.  \par – Continue carvedilol for its beta-blocking effect to suppress AF and the combined beta-blocker and alpha-blocker effect for hypertension.\par \par A/C - given hx. of PAF, continue Xarelto.\par \par 32 minutes spent on today's office visit.  \par \par He will return in 6 weeks.

## 2022-11-09 ENCOUNTER — LABORATORY RESULT (OUTPATIENT)
Age: 62
End: 2022-11-09

## 2022-11-09 ENCOUNTER — APPOINTMENT (OUTPATIENT)
Dept: CARDIOLOGY | Facility: CLINIC | Age: 62
End: 2022-11-09

## 2022-11-09 VITALS
RESPIRATION RATE: 17 BRPM | WEIGHT: 305 LBS | BODY MASS INDEX: 40.42 KG/M2 | OXYGEN SATURATION: 98 % | DIASTOLIC BLOOD PRESSURE: 89 MMHG | SYSTOLIC BLOOD PRESSURE: 178 MMHG | HEIGHT: 73 IN | HEART RATE: 55 BPM

## 2022-11-09 DIAGNOSIS — E66.01 MORBID (SEVERE) OBESITY DUE TO EXCESS CALORIES: ICD-10-CM

## 2022-11-09 PROCEDURE — 99213 OFFICE O/P EST LOW 20 MIN: CPT

## 2022-11-10 NOTE — HISTORY OF PRESENT ILLNESS
[FreeTextEntry1] : Omari returns today for follow up re dyspnea, elevated blood pressure readings at home and LE edema. \par \par As he returns, he has been feeling uneasy. He continues to have elevated blood pressure readings both at home at the clinic when he goes for his chemotherapy which he finds concerning.  He has been taking clonidine 0.2 mg twice daily along with his carvedilol 25 mg. For his chronic lower extremity edema, he remains on furosemide 60 mg once daily (he has been on 80 mg previously, but is intolerant of the higher dose). \par \par Today he also describes ongoing dysuria.\par \par There has been no recurrence of palpitations.  He reports no exertional chest discomfort. There have been no episode of orthopnea or PND.  \par \par He continues on Xarelto & reports no bleeding issues.

## 2022-11-10 NOTE — REASON FOR VISIT
Assessment:     Healthy 7 correction:   20/63   With correction:          Physical Exam  Vitals and nursing note reviewed  Exam conducted with a chaperone present  Constitutional:       General: He is active  He is not in acute distress  Appearance: Normal appearance  He is well-developed  He is not toxic-appearing  HENT:      Head: Normocephalic and atraumatic  Right Ear: Tympanic membrane, ear canal and external ear normal       Left Ear: Tympanic membrane, ear canal and external ear normal       Nose: Nose normal  No congestion or rhinorrhea  Mouth/Throat:      Mouth: Mucous membranes are moist       Pharynx: No oropharyngeal exudate or posterior oropharyngeal erythema  Eyes:      General:         Right eye: No discharge  Left eye: No discharge  Extraocular Movements: Extraocular movements intact  Conjunctiva/sclera: Conjunctivae normal       Pupils: Pupils are equal, round, and reactive to light  Cardiovascular:      Rate and Rhythm: Normal rate and regular rhythm  Pulses: Normal pulses  Heart sounds: Normal heart sounds  No murmur heard  Pulmonary:      Effort: Pulmonary effort is normal  No respiratory distress, nasal flaring or retractions  Breath sounds: Normal breath sounds  No stridor or decreased air movement  No wheezing, rhonchi or rales  Abdominal:      General: Abdomen is flat  Bowel sounds are normal  There is no distension  Palpations: Abdomen is soft  There is no mass  Tenderness: There is no abdominal tenderness  There is no guarding or rebound  Hernia: No hernia is present  Genitourinary:     Penis: Normal        Testes: Normal    Musculoskeletal:         General: No tenderness or deformity  Normal range of motion  Cervical back: Normal range of motion and neck supple  Lymphadenopathy:      Cervical: No cervical adenopathy  Skin:     General: Skin is warm  Capillary Refill: Capillary refill takes less than 2 seconds  Findings: No rash  Neurological:      General: No focal deficit present        Mental Status: He is alert  Cranial Nerves: No cranial nerve deficit  Motor: No weakness        Coordination: Coordination normal       Gait: Gait normal       Deep Tendon Reflexes: Reflexes normal    Psychiatric:         Mood and Affect: Mood normal          Behavior: Behavior normal  [FreeTextEntry1] : \par Omari Wallace returns for a followup regarding HTN, H/A and hx of exertional dyspnea and LE edema.\par

## 2022-11-10 NOTE — DISCUSSION/SUMMARY
[FreeTextEntry1] : Hypertension - systolic blood pressure are improved on clonidine to 0.2 mg twice daily.  Would like to see further improvement; advised increasing Lasix back to 80 mg. qd, however, he reports intolerance. Will continue furosemide 60 mg daily for now and add low dose hydralazine for added antihypertensive effect.\par \par LE edema - as above, increase furosemide back to 80 mg each day.  Recent labs reviewed reveal a stable creatinine level at 1.4 mg/dL.  Avoid salt and to keep fluid intake down as guided by thirst.  \par \par PAF \par - remains in sinus rhythm s/p cardioversion last December by Dr. Guadalupe.  \par – Continue carvedilol for its beta-blocking effect to suppress AF and the combined beta-blocker and alpha-blocker effect for hypertension.\par \par A/C - given hx. of PAF, continue Xarelto.\par \par 25 minutes spent on today's office visit.  \par \par He will return in 6 weeks.

## 2022-11-14 ENCOUNTER — APPOINTMENT (OUTPATIENT)
Dept: CARDIOLOGY | Facility: CLINIC | Age: 62
End: 2022-11-14

## 2022-11-14 VITALS — DIASTOLIC BLOOD PRESSURE: 80 MMHG | SYSTOLIC BLOOD PRESSURE: 146 MMHG

## 2022-11-14 VITALS
WEIGHT: 305 LBS | HEART RATE: 59 BPM | DIASTOLIC BLOOD PRESSURE: 84 MMHG | SYSTOLIC BLOOD PRESSURE: 156 MMHG | BODY MASS INDEX: 40.24 KG/M2 | OXYGEN SATURATION: 98 %

## 2022-11-14 VITALS — DIASTOLIC BLOOD PRESSURE: 74 MMHG | SYSTOLIC BLOOD PRESSURE: 138 MMHG

## 2022-11-14 DIAGNOSIS — R39.9 UNSPECIFIED SYMPTOMS AND SIGNS INVOLVING THE GENITOURINARY SYSTEM: ICD-10-CM

## 2022-11-14 LAB
APPEARANCE: CLEAR
BILIRUBIN URINE: NEGATIVE
BLOOD URINE: ABNORMAL
COLOR: NORMAL
GLUCOSE QUALITATIVE U: NEGATIVE
KETONES URINE: NEGATIVE
LEUKOCYTE ESTERASE URINE: ABNORMAL
NITRITE URINE: NEGATIVE
PH URINE: 5.5
PROTEIN URINE: NORMAL
SPECIFIC GRAVITY URINE: 1.01
UROBILINOGEN URINE: NORMAL

## 2022-11-14 PROCEDURE — 99214 OFFICE O/P EST MOD 30 MIN: CPT

## 2022-11-14 RX ORDER — SULFAMETHOXAZOLE AND TRIMETHOPRIM 800; 160 MG/1; MG/1
800-160 TABLET ORAL TWICE DAILY
Qty: 10 | Refills: 0 | Status: DISCONTINUED | COMMUNITY
Start: 2022-09-22 | End: 2022-11-14

## 2022-11-16 NOTE — ASSESSMENT
[FreeTextEntry1] : Hypertension\par \par PAF, s/p successful cardioversion to SR.\par \par Gastroesophageal reflux disease.\par \par Obesity, s/p bariatric surgery (gastric sleeve, repair of paraesophageal hernia 10/24/17).\par \par Biliary tract cancer\par \par Lower extremity venous insufficiency with associated trophic skin changes and edema.\par \par Azotemia\par \par UTI

## 2022-11-16 NOTE — DISCUSSION/SUMMARY
[FreeTextEntry1] : Hypertension - systolic blood pressure are improved on current regimen.  Will continue clonidine 0.2 mg bid, hydralazine 25 mg. bid, Coreg 25 mb. bid, and current furosemide 60 mg. daily.  Again reminded to follow low salt diet and renew efforts to lose weight.\par \par LE edema - continue furosemide 60 mg each day.   Avoid salt and to keep fluid intake down as guided by thirst.  \par \par PAF \par - remains in sinus rhythm s/p cardioversion December 2021 by Dr. Guadalupe.  \par – Continue carvedilol for its beta-blocking effect to suppress AF and the combined beta-blocker and alpha-blocker effect for hypertension.\par \par A/C - given hx. of PAF, continue Xarelto.\par \par UTI - a prescription for nitrofurantoin has been sent based on prior urine culture. We advised him to return to his urology, given the apparent UTI.  Likely suffering from BPH which may explain UTI and likely exacerbates urinary frequency on diuetics.\par \par 30 minutes spent on today's office visit.  \par \par He will return in 4 weeks.

## 2022-11-16 NOTE — REASON FOR VISIT
[FreeTextEntry1] : \par Omari Wallace returns for a followup regarding HTN, H/A and hx of exertional dyspnea and LE edema.\par

## 2022-12-05 ENCOUNTER — NON-APPOINTMENT (OUTPATIENT)
Age: 62
End: 2022-12-05

## 2023-01-30 ENCOUNTER — TRANSCRIPTION ENCOUNTER (OUTPATIENT)
Age: 63
End: 2023-01-30

## 2023-02-02 ENCOUNTER — APPOINTMENT (OUTPATIENT)
Dept: NEPHROLOGY | Facility: CLINIC | Age: 63
End: 2023-02-02

## 2023-02-12 ENCOUNTER — TRANSCRIPTION ENCOUNTER (OUTPATIENT)
Age: 63
End: 2023-02-12

## 2023-02-13 ENCOUNTER — RX RENEWAL (OUTPATIENT)
Age: 63
End: 2023-02-13

## 2023-03-22 ENCOUNTER — NON-APPOINTMENT (OUTPATIENT)
Age: 63
End: 2023-03-22

## 2023-03-22 ENCOUNTER — APPOINTMENT (OUTPATIENT)
Dept: CARDIOLOGY | Facility: CLINIC | Age: 63
End: 2023-03-22
Payer: COMMERCIAL

## 2023-03-22 VITALS
BODY MASS INDEX: 40.69 KG/M2 | OXYGEN SATURATION: 98 % | SYSTOLIC BLOOD PRESSURE: 147 MMHG | WEIGHT: 307 LBS | HEART RATE: 68 BPM | DIASTOLIC BLOOD PRESSURE: 84 MMHG | HEIGHT: 73 IN | RESPIRATION RATE: 17 BRPM

## 2023-03-22 DIAGNOSIS — C24.9 MALIGNANT NEOPLASM OF BILIARY TRACT, UNSPECIFIED: ICD-10-CM

## 2023-03-22 PROCEDURE — 93000 ELECTROCARDIOGRAM COMPLETE: CPT

## 2023-03-22 PROCEDURE — 99214 OFFICE O/P EST MOD 30 MIN: CPT | Mod: 25

## 2023-03-22 RX ORDER — FUROSEMIDE 40 MG/1
40 TABLET ORAL
Qty: 180 | Refills: 1 | Status: DISCONTINUED | COMMUNITY
Start: 2021-08-26 | End: 2023-03-22

## 2023-03-24 ENCOUNTER — NON-APPOINTMENT (OUTPATIENT)
Age: 63
End: 2023-03-24

## 2023-03-24 NOTE — ASSESSMENT
[FreeTextEntry1] : BLACKWELL\par \par Hypertension\par \par PAF, s/p successful cardioversion to SR.\par \par Lower extremity venous insufficiency with associated trophic skin changes and edema\par \par Azotemia\par \par Gastroesophageal reflux disease.\par \par Obesity, s/p bariatric surgery (gastric sleeve, repair of paraesophageal hernia 10/24/17).\par \par Biliary tract cancer\par \par \par \par

## 2023-03-24 NOTE — DISCUSSION/SUMMARY
[EKG obtained to assist in diagnosis and management of assessed problem(s)] : EKG obtained to assist in diagnosis and management of assessed problem(s) [FreeTextEntry1] : BLACKWELL/LE edema \par - Will exchange furosemide 80 mg daily for torsemide 40 mg daily.\par - Again reminded to avoid salt and to reduce fluid intake down as guided by thirst.  \par - suggested f/u with Dr. Yanes of nephrology re degree of azotemia\par \par HTN - systolic blood pressure reasonable but not ideal \par - continue clonidine 0.2 mg bid, hydralazine 25 mg. bid, Coreg 25 mb. bid.  \par - diuretic as above\par - low salt diet, reduced fluid intake as above; reduce calorie intake/weight control.\par \par PAF \par - remains in sinus rhythm s/p cardioversion December 2021 by Dr. Guadalupe.  \par – Continue carvedilol for its beta-blocking effect to suppress AF in addition to BP benefit.\par \par A/C - given hx. of PAF, continue Xarelto.\par \par Follow up labs scheduled for 4/4/2023. \par \par 32 minutes spent on today's office visit.

## 2023-03-24 NOTE — HISTORY OF PRESENT ILLNESS
[FreeTextEntry1] : As he returns today, he describes feeling more short of breath than usual over the past two weeks, especially when he walks up the stairs.  At chemotherapy last week, his blood work revealed a creatinine of 2.0, Na+ 139, K+ 4.6. \par \par He continues to drink 4-5 16 ounce bottles of water daily, as he is often annoyed by a dry mouth. He has now returned to work a few days a week, which he enjoys, and he is planning for a 12 day [place] cruise with his wife and friends in May. \par \par Meds are unchanged.  BP readings at home, readings have been reasonable. \par \par Although we had recommended furosemide 80 mg once daily, currently he takes 60 mg. qd due to urinary frequency and concern regarding the degree of azotemia.  He reports no recent palpitations & no episodes of exertional chest discomfort; there have been no episode of orthopnea or PND.  \par \par He continues on Xarelto; there have been no bleeding episodes.\par

## 2023-03-24 NOTE — REASON FOR VISIT
[FreeTextEntry1] : \par Omari Wallace returns for a followup regarding BLACKWELL as well as a hx. of HTN, H/A and LE edema.\par

## 2023-04-06 ENCOUNTER — TRANSCRIPTION ENCOUNTER (OUTPATIENT)
Age: 63
End: 2023-04-06

## 2023-04-06 RX ORDER — NITROFURANTOIN MACROCRYSTALS 100 MG/1
100 CAPSULE ORAL
Qty: 14 | Refills: 1 | Status: DISCONTINUED | COMMUNITY
Start: 2022-11-14 | End: 2023-04-06

## 2023-05-19 ENCOUNTER — TRANSCRIPTION ENCOUNTER (OUTPATIENT)
Age: 63
End: 2023-05-19

## 2023-05-21 ENCOUNTER — TRANSCRIPTION ENCOUNTER (OUTPATIENT)
Age: 63
End: 2023-05-21

## 2023-08-04 ENCOUNTER — NON-APPOINTMENT (OUTPATIENT)
Age: 63
End: 2023-08-04

## 2023-08-06 ENCOUNTER — TRANSCRIPTION ENCOUNTER (OUTPATIENT)
Age: 63
End: 2023-08-06

## 2023-08-07 ENCOUNTER — APPOINTMENT (OUTPATIENT)
Dept: CARDIOLOGY | Facility: CLINIC | Age: 63
End: 2023-08-07
Payer: COMMERCIAL

## 2023-08-07 VITALS
BODY MASS INDEX: 41.75 KG/M2 | HEART RATE: 65 BPM | OXYGEN SATURATION: 96 % | RESPIRATION RATE: 15 BRPM | HEIGHT: 73 IN | WEIGHT: 315 LBS | SYSTOLIC BLOOD PRESSURE: 190 MMHG | DIASTOLIC BLOOD PRESSURE: 84 MMHG

## 2023-08-07 DIAGNOSIS — Z01.810 ENCOUNTER FOR PREPROCEDURAL CARDIOVASCULAR EXAMINATION: ICD-10-CM

## 2023-08-07 PROCEDURE — 99215 OFFICE O/P EST HI 40 MIN: CPT

## 2023-08-08 ENCOUNTER — TRANSCRIPTION ENCOUNTER (OUTPATIENT)
Age: 63
End: 2023-08-08

## 2023-08-09 NOTE — REASON FOR VISIT
[FreeTextEntry1] : Omari Wallace returns for a follow-up prior to hernia surgery scheduled for later this week.  He has a hx. of HTN and LE edema.

## 2023-08-09 NOTE — DISCUSSION/SUMMARY
[FreeTextEntry1] : There is no cardiac contraindication to hernia repair later this week.  Mr. Gonsalez remains active and free of cardiac symptoms.  There is not hx. of recent MI, unstable angina, CHF, or significant valvular heart disease.  HTN - advised to take torsemide 40 mg daily.  Suspect reason for higher BP reading today is that he did not take diuretic this morning because he was driving back to [facility] from [place]. - Again reminded to avoid salt and to reduce fluid intake as guided by thirst.   - continue clonidine 0.2 mg bid, hydralazine 25 mg. bid, Coreg 25 mb. bid.    PAF  - sinus rhythm restored after cardioversion December 2021 by Dr. Guadalupe.   - Continue carvedilol for its beta-blocking effect to suppress AF in addition to BP benefit.  A/C - given hx. of PAF. In advance of his upcoming surgery, he took last dose of Xarelto yesterday. He has been advised to check with Dr. Nieves when he should restart it post op.  I have no objection to holding A/C for 2-3 days post op at Dr. Nieves's discretion.  ECG and preoperative labs reviewed.   Follow up O.V. here in Sept.  42 minutes spent on today's office visit.

## 2023-08-09 NOTE — ASSESSMENT
[FreeTextEntry1] : Hypertension  PAF, s/p successful cardioversion to SR.  Lower extremity venous insufficiency with associated trophic skin changes and edema  Azotemia  Gastroesophageal reflux disease.  Obesity, s/p bariatric surgery (gastric sleeve, repair of paraesophageal hernia 10/24/17).  Biliary tract cancer  Hernia

## 2023-08-09 NOTE — HISTORY OF PRESENT ILLNESS
[FreeTextEntry1] : He returns today for pre-operative cardiovascular evaluation, prior to planned robotic hernia repair with Tawanda Nieves MD at [facility] on 8/10/2023.   He remains active, engaging in his usual activities without problem.  He reports no episodes of exertional chest discomfort or significant BLACKWELL.   He describes no palpitations.  There have been no episodes of lightheadedness or loss of consciousness. There have been no episodes of orthopnea or PND.   Meds are unchanged; he did not take the diuretic today as he drove back from [place] earlier today.  He continues to monitor his BP at home and reports that readings have been reasonable, usually in the range of 130/80 mmHg.  He continues on Xarelto, although was advised to stop after yesterday's dose in anticipation of surgery later this week.

## 2023-08-17 ENCOUNTER — TRANSCRIPTION ENCOUNTER (OUTPATIENT)
Age: 63
End: 2023-08-17

## 2023-08-25 ENCOUNTER — APPOINTMENT (OUTPATIENT)
Dept: CARDIOLOGY | Facility: CLINIC | Age: 63
End: 2023-08-25
Payer: COMMERCIAL

## 2023-08-25 ENCOUNTER — NON-APPOINTMENT (OUTPATIENT)
Age: 63
End: 2023-08-25

## 2023-08-25 VITALS
OXYGEN SATURATION: 96 % | DIASTOLIC BLOOD PRESSURE: 70 MMHG | WEIGHT: 315 LBS | HEIGHT: 73 IN | SYSTOLIC BLOOD PRESSURE: 150 MMHG | BODY MASS INDEX: 41.75 KG/M2 | HEART RATE: 89 BPM

## 2023-08-25 PROCEDURE — 99214 OFFICE O/P EST MOD 30 MIN: CPT

## 2023-08-25 RX ORDER — TORSEMIDE 20 MG/1
20 TABLET ORAL
Qty: 120 | Refills: 2 | Status: ACTIVE | COMMUNITY
Start: 2023-03-22 | End: 1900-01-01

## 2023-08-28 ENCOUNTER — NON-APPOINTMENT (OUTPATIENT)
Age: 63
End: 2023-08-28

## 2023-08-28 NOTE — HISTORY OF PRESENT ILLNESS
[FreeTextEntry1] : He returns today for evaluation of shortness of breath, edema and weight gain following robotic hernia repair with Tawanda Nieves MD at [facility] on 8/10/2023.   He was admitted postoperatively for shortness of breath and JATIN.  He was given IV fluid with improvement of the serum creatinine.    At present, he reports shortness of breath, orthopnea and leg edema; as we see him today, his weight is up by  5 lbs. since Aug. 7 and 20 lbs. since March.  He describes no palpitations.  There have been no episodes of lightheadedness or loss of consciousness.   He continues to monitor his BP at home and reports that readings have been reasonable, usually in the range of 130/80 mmHg.  He continues on Xarelto, although at a reduced dose of 15 mg daily following surgery.  Labs of 8/23/2023: creatinine is 1.84, K+ 4.4, Na+ 139.

## 2023-08-28 NOTE — DISCUSSION/SUMMARY
[FreeTextEntry1] : BLACKWELL, suspect volume excess given increased weight and report on IV fluid adminstration on recent re-admission post op. - advised to take torsemide 40 mg twice daily over the weekend. - Again reminded to avoid salt and to reduce fluid intake as guided by thirst.   - advised patient to call with telephone follow up early next week. Labs early next week.   HTN - continue clonidine 0.2 mg bid, hydralazine 25 mg. bid, Coreg 25 mb. bid.    PAF  - sinus rhythm restored after cardioversion December 2021 by Dr. Guadalupe.   - Continue carvedilol for its beta-blocking effect to suppress AF in addition to BP benefit.  A/C - given hx. of PAF. Resume Xarelto 20 mg daily.   31 minutes spent on today's office visit.

## 2023-08-28 NOTE — REASON FOR VISIT
[FreeTextEntry1] :  Omari Wallace returns for a follow-up after recent hernia surgery at [facility].  Cardiac hx. includes HTN and LE edema.

## 2023-08-28 NOTE — ASSESSMENT
[FreeTextEntry1] : SOB post hernia repair  Hypertension  PAF, s/p successful cardioversion to SR.  Lower extremity venous insufficiency with associated trophic skin changes and edema  Azotemia  Gastroesophageal reflux disease.  Obesity, s/p bariatric surgery (gastric sleeve, repair of paraesophageal hernia 10/24/17).  Biliary tract neuroendocrine cancer

## 2023-08-30 ENCOUNTER — TRANSCRIPTION ENCOUNTER (OUTPATIENT)
Age: 63
End: 2023-08-30

## 2023-08-30 LAB
ALBUMIN SERPL ELPH-MCNC: 3.3 G/DL
ALP BLD-CCNC: 72 U/L
ALT SERPL-CCNC: 20 U/L
ANION GAP SERPL CALC-SCNC: 12 MMOL/L
AST SERPL-CCNC: 27 U/L
BILIRUB SERPL-MCNC: 0.4 MG/DL
BUN SERPL-MCNC: 36 MG/DL
CALCIUM SERPL-MCNC: 8.4 MG/DL
CHLORIDE SERPL-SCNC: 110 MMOL/L
CO2 SERPL-SCNC: 19 MMOL/L
CREAT SERPL-MCNC: 1.74 MG/DL
EGFR: 43 ML/MIN/1.73M2
FERRITIN SERPL-MCNC: 537 NG/ML
FOLATE SERPL-MCNC: 9.2 NG/ML
GLUCOSE SERPL-MCNC: 117 MG/DL
IRON SATN MFR SERPL: 10 %
IRON SERPL-MCNC: 23 UG/DL
POTASSIUM SERPL-SCNC: 4.6 MMOL/L
PROT SERPL-MCNC: 5.5 G/DL
SODIUM SERPL-SCNC: 140 MMOL/L
TIBC SERPL-MCNC: 226 UG/DL
TRANSFERRIN SERPL-MCNC: 183 MG/DL
UIBC SERPL-MCNC: 203 UG/DL
VIT B12 SERPL-MCNC: 493 PG/ML

## 2023-09-30 ENCOUNTER — TRANSCRIPTION ENCOUNTER (OUTPATIENT)
Age: 63
End: 2023-09-30

## 2023-10-24 ENCOUNTER — APPOINTMENT (OUTPATIENT)
Dept: CARDIOLOGY | Facility: CLINIC | Age: 63
End: 2023-10-24
Payer: MEDICARE

## 2023-10-24 ENCOUNTER — NON-APPOINTMENT (OUTPATIENT)
Age: 63
End: 2023-10-24

## 2023-10-24 VITALS
DIASTOLIC BLOOD PRESSURE: 90 MMHG | SYSTOLIC BLOOD PRESSURE: 200 MMHG | HEIGHT: 73 IN | BODY MASS INDEX: 41.75 KG/M2 | WEIGHT: 315 LBS | RESPIRATION RATE: 14 BRPM | HEART RATE: 58 BPM | OXYGEN SATURATION: 95 %

## 2023-10-24 DIAGNOSIS — R06.00 DYSPNEA, UNSPECIFIED: ICD-10-CM

## 2023-10-24 PROCEDURE — 93000 ELECTROCARDIOGRAM COMPLETE: CPT

## 2023-10-24 PROCEDURE — 99214 OFFICE O/P EST MOD 30 MIN: CPT

## 2023-10-26 ENCOUNTER — TRANSCRIPTION ENCOUNTER (OUTPATIENT)
Age: 63
End: 2023-10-26

## 2023-11-06 ENCOUNTER — APPOINTMENT (OUTPATIENT)
Dept: CARDIOLOGY | Facility: CLINIC | Age: 63
End: 2023-11-06
Payer: MEDICARE

## 2023-11-06 VITALS
BODY MASS INDEX: 42.35 KG/M2 | SYSTOLIC BLOOD PRESSURE: 160 MMHG | DIASTOLIC BLOOD PRESSURE: 90 MMHG | WEIGHT: 315 LBS | HEART RATE: 65 BPM | OXYGEN SATURATION: 97 %

## 2023-11-06 PROCEDURE — 99213 OFFICE O/P EST LOW 20 MIN: CPT

## 2023-11-08 ENCOUNTER — TRANSCRIPTION ENCOUNTER (OUTPATIENT)
Age: 63
End: 2023-11-08

## 2023-11-08 RX ORDER — CLOBETASOL PROPIONATE 0.5 MG/ML
0.05 SOLUTION TOPICAL
Qty: 50 | Refills: 0 | Status: ACTIVE | COMMUNITY
Start: 2023-10-02

## 2023-11-08 RX ORDER — HYDRALAZINE HYDROCHLORIDE 25 MG/1
25 TABLET ORAL
Qty: 180 | Refills: 0 | Status: COMPLETED | COMMUNITY
Start: 2023-09-30

## 2023-11-08 RX ORDER — CICLOPIROX 10 MG/.96ML
1 SHAMPOO TOPICAL
Qty: 120 | Refills: 0 | Status: ACTIVE | COMMUNITY
Start: 2023-10-02

## 2023-11-14 ENCOUNTER — TRANSCRIPTION ENCOUNTER (OUTPATIENT)
Age: 63
End: 2023-11-14

## 2023-11-15 ENCOUNTER — APPOINTMENT (OUTPATIENT)
Dept: CARDIOLOGY | Facility: CLINIC | Age: 63
End: 2023-11-15
Payer: MEDICARE

## 2023-11-15 PROCEDURE — 99211 OFF/OP EST MAY X REQ PHY/QHP: CPT

## 2023-11-28 ENCOUNTER — APPOINTMENT (OUTPATIENT)
Dept: CARDIOLOGY | Facility: CLINIC | Age: 63
End: 2023-11-28

## 2023-11-30 ENCOUNTER — TRANSCRIPTION ENCOUNTER (OUTPATIENT)
Age: 63
End: 2023-11-30

## 2023-12-07 ENCOUNTER — APPOINTMENT (OUTPATIENT)
Dept: NEPHROLOGY | Facility: CLINIC | Age: 63
End: 2023-12-07
Payer: MEDICARE

## 2023-12-07 VITALS
SYSTOLIC BLOOD PRESSURE: 184 MMHG | DIASTOLIC BLOOD PRESSURE: 84 MMHG | WEIGHT: 312 LBS | HEIGHT: 73 IN | OXYGEN SATURATION: 97 % | BODY MASS INDEX: 41.35 KG/M2 | HEART RATE: 60 BPM | TEMPERATURE: 97.4 F

## 2023-12-07 DIAGNOSIS — N17.9 ACUTE KIDNEY FAILURE, UNSPECIFIED: ICD-10-CM

## 2023-12-07 PROCEDURE — 99215 OFFICE O/P EST HI 40 MIN: CPT

## 2023-12-08 ENCOUNTER — TRANSCRIPTION ENCOUNTER (OUTPATIENT)
Age: 63
End: 2023-12-08

## 2023-12-08 LAB
ALBUMIN SERPL ELPH-MCNC: 4 G/DL
ANION GAP SERPL CALC-SCNC: 10 MMOL/L
BUN SERPL-MCNC: 30 MG/DL
CALCIUM SERPL-MCNC: 8.8 MG/DL
CHLORIDE SERPL-SCNC: 105 MMOL/L
CO2 SERPL-SCNC: 27 MMOL/L
CREAT SERPL-MCNC: 1.58 MG/DL
CREAT SPEC-SCNC: 119 MG/DL
CREAT SPEC-SCNC: 119 MG/DL
CREAT/PROT UR: 0.3 RATIO
EGFR: 48 ML/MIN/1.73M2
GLUCOSE SERPL-MCNC: 72 MG/DL
HAPTOGLOB SERPL-MCNC: 144 MG/DL
HCT VFR BLD CALC: 38.9 %
HGB BLD-MCNC: 12 G/DL
LDH SERPL-CCNC: 217 U/L
MCHC RBC-ENTMCNC: 29.1 PG
MCHC RBC-ENTMCNC: 30.8 GM/DL
MCV RBC AUTO: 94.2 FL
MICROALBUMIN 24H UR DL<=1MG/L-MCNC: 10.1 MG/DL
MICROALBUMIN/CREAT 24H UR-RTO: 85 MG/G
PHOSPHATE SERPL-MCNC: 2.5 MG/DL
PLATELET # BLD AUTO: 115 K/UL
POTASSIUM SERPL-SCNC: 5 MMOL/L
PROT UR-MCNC: 30 MG/DL
RBC # BLD: 4.13 M/UL
RBC # FLD: 16.1 %
SODIUM SERPL-SCNC: 142 MMOL/L
WBC # FLD AUTO: 3.95 K/UL

## 2023-12-11 ENCOUNTER — TRANSCRIPTION ENCOUNTER (OUTPATIENT)
Age: 63
End: 2023-12-11

## 2023-12-11 ENCOUNTER — NON-APPOINTMENT (OUTPATIENT)
Age: 63
End: 2023-12-11

## 2023-12-18 ENCOUNTER — TRANSCRIPTION ENCOUNTER (OUTPATIENT)
Age: 63
End: 2023-12-18

## 2023-12-22 LAB
ALBUMIN SERPL ELPH-MCNC: 4.1 G/DL
ANION GAP SERPL CALC-SCNC: 14 MMOL/L
BUN SERPL-MCNC: 37 MG/DL
CALCIUM SERPL-MCNC: 8.6 MG/DL
CHLORIDE SERPL-SCNC: 105 MMOL/L
CO2 SERPL-SCNC: 23 MMOL/L
CREAT SERPL-MCNC: 1.8 MG/DL
EGFR: 41 ML/MIN/1.73M2
GLUCOSE SERPL-MCNC: 82 MG/DL
PHOSPHATE SERPL-MCNC: 2.9 MG/DL
POTASSIUM SERPL-SCNC: 4.4 MMOL/L
SODIUM SERPL-SCNC: 143 MMOL/L

## 2023-12-27 ENCOUNTER — TRANSCRIPTION ENCOUNTER (OUTPATIENT)
Age: 63
End: 2023-12-27

## 2024-01-02 ENCOUNTER — TRANSCRIPTION ENCOUNTER (OUTPATIENT)
Age: 64
End: 2024-01-02

## 2024-01-05 ENCOUNTER — TRANSCRIPTION ENCOUNTER (OUTPATIENT)
Age: 64
End: 2024-01-05

## 2024-02-09 ENCOUNTER — APPOINTMENT (OUTPATIENT)
Dept: ULTRASOUND IMAGING | Facility: CLINIC | Age: 64
End: 2024-02-09
Payer: MEDICARE

## 2024-02-09 ENCOUNTER — TRANSCRIPTION ENCOUNTER (OUTPATIENT)
Age: 64
End: 2024-02-09

## 2024-02-09 PROCEDURE — 20604 DRAIN/INJ JOINT/BURSA W/US: CPT | Mod: RT

## 2024-03-03 ENCOUNTER — TRANSCRIPTION ENCOUNTER (OUTPATIENT)
Age: 64
End: 2024-03-03

## 2024-03-10 ENCOUNTER — TRANSCRIPTION ENCOUNTER (OUTPATIENT)
Age: 64
End: 2024-03-10

## 2024-04-01 PROBLEM — R60.0 EDEMA, LOWER EXTREMITY: Status: ACTIVE | Noted: 2021-08-26

## 2024-04-01 PROBLEM — Z79.01 ANTICOAGULATED: Status: ACTIVE | Noted: 2021-08-25

## 2024-04-02 ENCOUNTER — NON-APPOINTMENT (OUTPATIENT)
Age: 64
End: 2024-04-02

## 2024-04-02 ENCOUNTER — APPOINTMENT (OUTPATIENT)
Dept: CARDIOLOGY | Facility: CLINIC | Age: 64
End: 2024-04-02
Payer: MEDICARE

## 2024-04-02 VITALS
OXYGEN SATURATION: 94 % | SYSTOLIC BLOOD PRESSURE: 124 MMHG | WEIGHT: 315 LBS | DIASTOLIC BLOOD PRESSURE: 70 MMHG | HEIGHT: 73 IN | HEART RATE: 70 BPM | BODY MASS INDEX: 41.75 KG/M2

## 2024-04-02 DIAGNOSIS — I10 ESSENTIAL (PRIMARY) HYPERTENSION: ICD-10-CM

## 2024-04-02 DIAGNOSIS — R60.0 LOCALIZED EDEMA: ICD-10-CM

## 2024-04-02 DIAGNOSIS — Z79.01 LONG TERM (CURRENT) USE OF ANTICOAGULANTS: ICD-10-CM

## 2024-04-02 DIAGNOSIS — I48.0 PAROXYSMAL ATRIAL FIBRILLATION: ICD-10-CM

## 2024-04-02 PROCEDURE — 93000 ELECTROCARDIOGRAM COMPLETE: CPT

## 2024-04-02 PROCEDURE — 99214 OFFICE O/P EST MOD 30 MIN: CPT

## 2024-04-02 PROCEDURE — G2211 COMPLEX E/M VISIT ADD ON: CPT

## 2024-04-02 RX ORDER — CARVEDILOL 25 MG/1
25 TABLET, FILM COATED ORAL TWICE DAILY
Qty: 180 | Refills: 3 | Status: ACTIVE | COMMUNITY
Start: 2022-05-06 | End: 1900-01-01

## 2024-04-02 RX ORDER — POTASSIUM CHLORIDE 750 MG/1
10 CAPSULE, EXTENDED RELEASE ORAL TWICE DAILY
Qty: 180 | Refills: 3 | Status: DISCONTINUED | COMMUNITY
Start: 2021-11-12 | End: 2024-04-02

## 2024-04-02 NOTE — HISTORY OF PRESENT ILLNESS
[FreeTextEntry1] : We saw him last, he has been feeling well overall.  He describes no shortness of breath or palps.  He reports no episodes of CP & describes no orthopnea or PND.  Leg edema remains controlled.  There have been no episodes of lightheadedness or loss of consciousness.   Currently, he is getting a respite from chemoRx; CT has not shown evidence of cancer recurrence.

## 2024-04-02 NOTE — PHYSICAL EXAM
[Normal Appearance] : normal appearance [General Appearance - In No Acute Distress] : no acute distress [Normal Conjunctiva] : the conjunctiva exhibited no abnormalities [Eyelids - No Xanthelasma] : the eyelids demonstrated no xanthelasmas [Normal Jugular Venous V Waves Present] : normal jugular venous V waves present [Normal Jugular Venous A Waves Present] : normal jugular venous A waves present [No Jugular Venous Raymundo A Waves] : no jugular venous raymundo A waves [Respiration, Rhythm And Depth] : normal respiratory rhythm and effort [Auscultation Breath Sounds / Voice Sounds] : lungs were clear to auscultation bilaterally [Murmurs] : no murmurs present [Heart Rate And Rhythm] : heart rate and rhythm were normal [Bowel Sounds] : normal bowel sounds [Abnormal Walk] : normal gait [Nail Clubbing] : no clubbing of the fingernails [Cyanosis, Localized] : no localized cyanosis [Skin Color & Pigmentation] : normal skin color and pigmentation [] : no rash [Impaired Insight] : insight and judgment were intact [Oriented To Time, Place, And Person] : oriented to person, place, and time [Affect] : the affect was normal [Mood] : the mood was normal [FreeTextEntry1] : Mild skin changes over the pretibial surfaces consistent with venous insufficiency.  Small abrasion on the shin.

## 2024-04-02 NOTE — DISCUSSION/SUMMARY
[FreeTextEntry1] : EKG today shows:   Sinus Rhythm at 67 bpm.  LAD, LAHB, NS ST/T abn; no significant change.  PLAN: 1.  HTN - continue current meds. - renew hydralazine & Coreg.  2.  PAF  - remains in SR post cardioversion December 2021 by Dr. Guadalupe.   - Continue carvedilol for its beta-blocking effect to suppress AF in addition to BP benefit. - continue A/C.  3.  A/C - given hx. of PAF, continue Xarelto 20 mg daily.   32 minutes spent on today's office visit.    He will return in six months for O.V. and echo. [EKG obtained to assist in diagnosis and management of assessed problem(s)] : EKG obtained to assist in diagnosis and management of assessed problem(s)

## 2024-04-02 NOTE — ASSESSMENT
[FreeTextEntry1] : ================== [facility]  Hypertension  PAF, s/p successful cardioversion to SR.  Lower extremity venous insufficiency/edema  Azotemia  Gastroesophageal reflux disease.  Obesity, s/p bariatric surgery (gastric sleeve, repair of paraesophageal hernia 10/24/17).  Biliary tract cancer

## 2024-04-10 ENCOUNTER — TRANSCRIPTION ENCOUNTER (OUTPATIENT)
Age: 64
End: 2024-04-10

## 2024-04-13 ENCOUNTER — TRANSCRIPTION ENCOUNTER (OUTPATIENT)
Age: 64
End: 2024-04-13

## 2024-04-13 RX ORDER — CLONIDINE HYDROCHLORIDE 0.2 MG/1
0.2 TABLET ORAL
Qty: 270 | Refills: 5 | Status: ACTIVE | COMMUNITY
Start: 2022-07-08 | End: 1900-01-01

## 2024-04-15 ENCOUNTER — TRANSCRIPTION ENCOUNTER (OUTPATIENT)
Age: 64
End: 2024-04-15

## 2024-04-15 RX ORDER — HYDRALAZINE HYDROCHLORIDE 50 MG/1
50 TABLET ORAL
Qty: 90 | Refills: 1 | Status: ACTIVE | COMMUNITY
Start: 2022-11-09 | End: 1900-01-01

## 2024-05-13 ENCOUNTER — TRANSCRIPTION ENCOUNTER (OUTPATIENT)
Age: 64
End: 2024-05-13

## 2024-06-20 ENCOUNTER — APPOINTMENT (OUTPATIENT)
Dept: NEPHROLOGY | Facility: CLINIC | Age: 64
End: 2024-06-20

## 2024-06-24 ENCOUNTER — TRANSCRIPTION ENCOUNTER (OUTPATIENT)
Age: 64
End: 2024-06-24

## 2024-06-24 RX ORDER — RIVAROXABAN 20 MG/1
20 TABLET, FILM COATED ORAL
Qty: 90 | Refills: 2 | Status: ACTIVE | COMMUNITY
Start: 2021-08-26 | End: 1900-01-01

## 2024-08-05 ENCOUNTER — TRANSCRIPTION ENCOUNTER (OUTPATIENT)
Age: 64
End: 2024-08-05

## 2024-08-15 ENCOUNTER — TRANSCRIPTION ENCOUNTER (OUTPATIENT)
Age: 64
End: 2024-08-15

## 2024-08-20 ENCOUNTER — NON-APPOINTMENT (OUTPATIENT)
Age: 64
End: 2024-08-20

## 2024-08-30 ENCOUNTER — TRANSCRIPTION ENCOUNTER (OUTPATIENT)
Age: 64
End: 2024-08-30

## 2024-09-06 ENCOUNTER — TRANSCRIPTION ENCOUNTER (OUTPATIENT)
Age: 64
End: 2024-09-06

## 2024-10-07 ENCOUNTER — APPOINTMENT (OUTPATIENT)
Dept: CARDIOLOGY | Facility: CLINIC | Age: 64
End: 2024-10-07

## 2024-12-18 ENCOUNTER — APPOINTMENT (OUTPATIENT)
Dept: OTOLARYNGOLOGY | Facility: CLINIC | Age: 64
End: 2024-12-18

## 2024-12-21 ENCOUNTER — NON-APPOINTMENT (OUTPATIENT)
Age: 64
End: 2024-12-21

## 2025-01-04 ENCOUNTER — NON-APPOINTMENT (OUTPATIENT)
Age: 65
End: 2025-01-04

## 2025-01-07 ENCOUNTER — NON-APPOINTMENT (OUTPATIENT)
Age: 65
End: 2025-01-07

## 2025-01-07 ENCOUNTER — APPOINTMENT (OUTPATIENT)
Dept: CARDIOLOGY | Facility: CLINIC | Age: 65
End: 2025-01-07
Payer: MEDICARE

## 2025-01-07 VITALS
HEART RATE: 93 BPM | BODY MASS INDEX: 33.66 KG/M2 | SYSTOLIC BLOOD PRESSURE: 116 MMHG | WEIGHT: 254 LBS | DIASTOLIC BLOOD PRESSURE: 80 MMHG | HEIGHT: 73 IN

## 2025-01-07 DIAGNOSIS — I48.0 PAROXYSMAL ATRIAL FIBRILLATION: ICD-10-CM

## 2025-01-07 DIAGNOSIS — R53.83 OTHER FATIGUE: ICD-10-CM

## 2025-01-07 DIAGNOSIS — I10 ESSENTIAL (PRIMARY) HYPERTENSION: ICD-10-CM

## 2025-01-07 DIAGNOSIS — R60.0 LOCALIZED EDEMA: ICD-10-CM

## 2025-01-07 DIAGNOSIS — Z79.01 LONG TERM (CURRENT) USE OF ANTICOAGULANTS: ICD-10-CM

## 2025-01-07 PROCEDURE — 99215 OFFICE O/P EST HI 40 MIN: CPT

## 2025-01-07 PROCEDURE — G2211 COMPLEX E/M VISIT ADD ON: CPT

## 2025-01-07 PROCEDURE — 93000 ELECTROCARDIOGRAM COMPLETE: CPT

## 2025-01-08 LAB
ALBUMIN SERPL ELPH-MCNC: 4 G/DL
ALP BLD-CCNC: 65 U/L
ALT SERPL-CCNC: 15 U/L
ANION GAP SERPL CALC-SCNC: 12 MMOL/L
AST SERPL-CCNC: 21 U/L
BILIRUB SERPL-MCNC: 0.8 MG/DL
BUN SERPL-MCNC: 43 MG/DL
CALCIUM SERPL-MCNC: 9 MG/DL
CHLORIDE SERPL-SCNC: 105 MMOL/L
CO2 SERPL-SCNC: 23 MMOL/L
CREAT SERPL-MCNC: 1.58 MG/DL
EGFR: 48 ML/MIN/1.73M2
GLUCOSE SERPL-MCNC: 82 MG/DL
HCT VFR BLD CALC: 42.6 %
HGB BLD-MCNC: 13.5 G/DL
MCHC RBC-ENTMCNC: 28.2 PG
MCHC RBC-ENTMCNC: 31.7 G/DL
MCV RBC AUTO: 89.1 FL
PLATELET # BLD AUTO: 180 K/UL
POTASSIUM SERPL-SCNC: 4.3 MMOL/L
PROT SERPL-MCNC: 6.4 G/DL
RBC # BLD: 4.78 M/UL
RBC # FLD: 14.6 %
SODIUM SERPL-SCNC: 140 MMOL/L
TSH SERPL-ACNC: 2.63 UIU/ML
WBC # FLD AUTO: 6.64 K/UL

## 2025-01-21 ENCOUNTER — APPOINTMENT (OUTPATIENT)
Dept: CARDIOLOGY | Facility: CLINIC | Age: 65
End: 2025-01-21
Payer: MEDICARE

## 2025-01-21 ENCOUNTER — NON-APPOINTMENT (OUTPATIENT)
Age: 65
End: 2025-01-21

## 2025-01-21 VITALS — DIASTOLIC BLOOD PRESSURE: 60 MMHG | SYSTOLIC BLOOD PRESSURE: 92 MMHG

## 2025-01-21 PROCEDURE — 93306 TTE W/DOPPLER COMPLETE: CPT

## 2025-02-04 ENCOUNTER — APPOINTMENT (OUTPATIENT)
Dept: CARDIOLOGY | Facility: CLINIC | Age: 65
End: 2025-02-04
Payer: MEDICARE

## 2025-02-04 DIAGNOSIS — Z79.01 LONG TERM (CURRENT) USE OF ANTICOAGULANTS: ICD-10-CM

## 2025-02-04 DIAGNOSIS — I48.0 PAROXYSMAL ATRIAL FIBRILLATION: ICD-10-CM

## 2025-02-04 DIAGNOSIS — I10 ESSENTIAL (PRIMARY) HYPERTENSION: ICD-10-CM

## 2025-02-04 PROCEDURE — G2211 COMPLEX E/M VISIT ADD ON: CPT

## 2025-02-04 PROCEDURE — 93000 ELECTROCARDIOGRAM COMPLETE: CPT

## 2025-02-04 PROCEDURE — 99215 OFFICE O/P EST HI 40 MIN: CPT

## 2025-02-04 RX ORDER — APIXABAN 5 MG/1
5 TABLET, FILM COATED ORAL
Qty: 180 | Refills: 3 | Status: ACTIVE | COMMUNITY
Start: 2025-02-04 | End: 1900-01-01

## 2025-02-05 ENCOUNTER — NON-APPOINTMENT (OUTPATIENT)
Age: 65
End: 2025-02-05

## 2025-02-05 ENCOUNTER — APPOINTMENT (OUTPATIENT)
Dept: ELECTROPHYSIOLOGY | Facility: CLINIC | Age: 65
End: 2025-02-05
Payer: MEDICARE

## 2025-02-05 VITALS
OXYGEN SATURATION: 99 % | BODY MASS INDEX: 33.51 KG/M2 | HEART RATE: 88 BPM | DIASTOLIC BLOOD PRESSURE: 87 MMHG | WEIGHT: 254 LBS | SYSTOLIC BLOOD PRESSURE: 135 MMHG

## 2025-02-05 DIAGNOSIS — R06.00 DYSPNEA, UNSPECIFIED: ICD-10-CM

## 2025-02-05 DIAGNOSIS — I48.19 OTHER PERSISTENT ATRIAL FIBRILLATION: ICD-10-CM

## 2025-02-05 PROCEDURE — 99205 OFFICE O/P NEW HI 60 MIN: CPT

## 2025-02-05 PROCEDURE — 93000 ELECTROCARDIOGRAM COMPLETE: CPT

## 2025-02-06 ENCOUNTER — TRANSCRIPTION ENCOUNTER (OUTPATIENT)
Age: 65
End: 2025-02-06

## 2025-02-09 PROBLEM — I48.19 ATRIAL FIBRILLATION, PERSISTENT: Status: ACTIVE | Noted: 2025-02-09

## 2025-02-21 ENCOUNTER — NON-APPOINTMENT (OUTPATIENT)
Age: 65
End: 2025-02-21

## 2025-03-21 ENCOUNTER — TRANSCRIPTION ENCOUNTER (OUTPATIENT)
Age: 65
End: 2025-03-21

## 2025-04-02 ENCOUNTER — APPOINTMENT (OUTPATIENT)
Age: 65
End: 2025-04-02
Payer: MEDICARE

## 2025-04-02 VITALS — HEIGHT: 72 IN | BODY MASS INDEX: 32.37 KG/M2 | WEIGHT: 239 LBS

## 2025-04-02 DIAGNOSIS — G62.9 POLYNEUROPATHY, UNSPECIFIED: ICD-10-CM

## 2025-04-02 DIAGNOSIS — L60.0 INGROWING NAIL: ICD-10-CM

## 2025-04-02 DIAGNOSIS — M79.674 PAIN IN RIGHT TOE(S): ICD-10-CM

## 2025-04-02 DIAGNOSIS — M20.41 OTHER HAMMER TOE(S) (ACQUIRED), RIGHT FOOT: ICD-10-CM

## 2025-04-02 DIAGNOSIS — M20.42 OTHER HAMMER TOE(S) (ACQUIRED), LEFT FOOT: ICD-10-CM

## 2025-04-02 PROCEDURE — 99213 OFFICE O/P EST LOW 20 MIN: CPT

## 2025-04-02 PROCEDURE — 11721 DEBRIDE NAIL 6 OR MORE: CPT

## 2025-04-02 PROCEDURE — 99203 OFFICE O/P NEW LOW 30 MIN: CPT | Mod: 25

## 2025-04-03 PROBLEM — M20.42 HAMMER TOE OF LEFT FOOT: Status: ACTIVE | Noted: 2025-04-03

## 2025-04-03 PROBLEM — M79.674 PAIN OF TOE OF RIGHT FOOT: Status: ACTIVE | Noted: 2025-04-03

## 2025-04-03 PROBLEM — G62.9 NEUROPATHY: Status: ACTIVE | Noted: 2025-04-03

## 2025-04-03 PROBLEM — L60.0 INGROWN TOENAIL: Status: ACTIVE | Noted: 2025-04-03

## 2025-04-03 PROBLEM — M20.41 HAMMER TOE OF RIGHT FOOT: Status: ACTIVE | Noted: 2025-04-03

## 2025-04-29 ENCOUNTER — APPOINTMENT (OUTPATIENT)
Dept: CARDIOLOGY | Facility: CLINIC | Age: 65
End: 2025-04-29

## 2025-06-04 ENCOUNTER — APPOINTMENT (OUTPATIENT)
Age: 65
End: 2025-06-04
Payer: MEDICARE

## 2025-06-04 VITALS — BODY MASS INDEX: 29.16 KG/M2 | WEIGHT: 220 LBS | HEIGHT: 73 IN

## 2025-06-04 PROCEDURE — 99213 OFFICE O/P EST LOW 20 MIN: CPT

## 2025-07-02 ENCOUNTER — APPOINTMENT (OUTPATIENT)
Age: 65
End: 2025-07-02
Payer: MEDICARE

## 2025-07-02 VITALS — BODY MASS INDEX: 29.16 KG/M2 | HEIGHT: 73 IN | WEIGHT: 220 LBS

## 2025-07-02 PROCEDURE — 99213 OFFICE O/P EST LOW 20 MIN: CPT
